# Patient Record
Sex: FEMALE | Race: WHITE | Employment: OTHER | URBAN - METROPOLITAN AREA
[De-identification: names, ages, dates, MRNs, and addresses within clinical notes are randomized per-mention and may not be internally consistent; named-entity substitution may affect disease eponyms.]

---

## 2019-02-18 ENCOUNTER — OFFICE VISIT (OUTPATIENT)
Dept: INTERNAL MEDICINE CLINIC | Age: 84
End: 2019-02-18

## 2019-02-18 VITALS
DIASTOLIC BLOOD PRESSURE: 85 MMHG | WEIGHT: 206 LBS | TEMPERATURE: 97.8 F | BODY MASS INDEX: 32.33 KG/M2 | HEART RATE: 72 BPM | HEIGHT: 67 IN | SYSTOLIC BLOOD PRESSURE: 150 MMHG

## 2019-02-18 DIAGNOSIS — C44.90 SKIN CANCER: ICD-10-CM

## 2019-02-18 DIAGNOSIS — R60.9 PERIPHERAL EDEMA: ICD-10-CM

## 2019-02-18 DIAGNOSIS — Z76.89 ENCOUNTER TO ESTABLISH CARE: Primary | ICD-10-CM

## 2019-02-18 DIAGNOSIS — R03.0 ELEVATED BP WITHOUT DIAGNOSIS OF HYPERTENSION: ICD-10-CM

## 2019-02-18 DIAGNOSIS — R41.3 MEMORY LOSS: ICD-10-CM

## 2019-02-18 PROCEDURE — 81002 URINALYSIS NONAUTO W/O SCOPE: CPT | Performed by: PHYSICIAN ASSISTANT

## 2019-02-18 PROCEDURE — 99204 OFFICE O/P NEW MOD 45 MIN: CPT | Performed by: PHYSICIAN ASSISTANT

## 2019-02-18 ASSESSMENT — ENCOUNTER SYMPTOMS
BACK PAIN: 0
VOMITING: 0
SHORTNESS OF BREATH: 0
NAUSEA: 0
SORE THROAT: 0
BLOOD IN STOOL: 0
DIARRHEA: 0
ABDOMINAL PAIN: 0
COUGH: 0
RHINORRHEA: 0

## 2019-03-07 ENCOUNTER — HOSPITAL ENCOUNTER (EMERGENCY)
Age: 84
Discharge: HOME | End: 2019-03-07
Payer: MEDICARE

## 2019-03-07 VITALS
OXYGEN SATURATION: 96 % | BODY MASS INDEX: 32.33 KG/M2 | HEART RATE: 94 BPM | SYSTOLIC BLOOD PRESSURE: 167 MMHG | DIASTOLIC BLOOD PRESSURE: 85 MMHG | RESPIRATION RATE: 14 BRPM | TEMPERATURE: 97.9 F | WEIGHT: 206 LBS | HEIGHT: 67 IN

## 2019-03-07 DIAGNOSIS — B02.9 HERPES ZOSTER WITHOUT COMPLICATION: Primary | ICD-10-CM

## 2019-03-07 PROCEDURE — 99282 EMERGENCY DEPT VISIT SF MDM: CPT

## 2019-03-07 PROCEDURE — 6370000000 HC RX 637 (ALT 250 FOR IP): Performed by: NURSE PRACTITIONER

## 2019-03-07 RX ORDER — ACYCLOVIR 200 MG/1
800 CAPSULE ORAL ONCE
Status: DISCONTINUED | OUTPATIENT
Start: 2019-03-07 | End: 2019-03-07

## 2019-03-07 RX ORDER — HYDROCODONE BITARTRATE AND ACETAMINOPHEN 5; 325 MG/1; MG/1
1 TABLET ORAL ONCE
Status: COMPLETED | OUTPATIENT
Start: 2019-03-07 | End: 2019-03-07

## 2019-03-07 RX ORDER — ACYCLOVIR 800 MG/1
800 TABLET ORAL ONCE
Status: COMPLETED | OUTPATIENT
Start: 2019-03-07 | End: 2019-03-07

## 2019-03-07 RX ORDER — ACYCLOVIR 800 MG/1
800 TABLET ORAL
Qty: 50 TABLET | Refills: 0 | Status: SHIPPED | OUTPATIENT
Start: 2019-03-07 | End: 2019-03-17

## 2019-03-07 RX ORDER — HYDROCODONE BITARTRATE AND ACETAMINOPHEN 5; 325 MG/1; MG/1
1 TABLET ORAL EVERY 6 HOURS PRN
Qty: 20 TABLET | Refills: 0 | Status: SHIPPED | OUTPATIENT
Start: 2019-03-07 | End: 2019-03-14

## 2019-03-07 RX ADMIN — ACYCLOVIR 800 MG: 800 TABLET ORAL at 18:45

## 2019-03-07 RX ADMIN — HYDROCODONE BITARTRATE AND ACETAMINOPHEN 1 TABLET: 5; 325 TABLET ORAL at 18:46

## 2019-03-07 ASSESSMENT — PAIN SCALES - GENERAL: PAINLEVEL_OUTOF10: 3

## 2019-03-29 ENCOUNTER — HOSPITAL ENCOUNTER (OUTPATIENT)
Age: 84
Discharge: HOME | End: 2019-03-29
Payer: MEDICARE

## 2019-03-29 DIAGNOSIS — Z76.89 ENCOUNTER TO ESTABLISH CARE: ICD-10-CM

## 2019-03-29 DIAGNOSIS — R41.3 MEMORY LOSS: ICD-10-CM

## 2019-03-29 DIAGNOSIS — R03.0 ELEVATED BP WITHOUT DIAGNOSIS OF HYPERTENSION: ICD-10-CM

## 2019-03-29 LAB
A/G RATIO: 1.2 (ref 1.1–2.2)
ALBUMIN SERPL-MCNC: 3.9 G/DL (ref 3.4–5)
ALP BLD-CCNC: 77 U/L (ref 40–129)
ALT SERPL-CCNC: 10 U/L (ref 10–40)
ANION GAP SERPL CALCULATED.3IONS-SCNC: 7 MMOL/L (ref 3–16)
AST SERPL-CCNC: 10 U/L (ref 15–37)
BASOPHILS ABSOLUTE: 0 K/UL (ref 0–0.2)
BASOPHILS RELATIVE PERCENT: 0.4 %
BILIRUB SERPL-MCNC: 0.5 MG/DL (ref 0–1)
BUN BLDV-MCNC: 22 MG/DL (ref 7–20)
CALCIUM SERPL-MCNC: 9.6 MG/DL (ref 8.3–10.6)
CHLORIDE BLD-SCNC: 105 MMOL/L (ref 99–110)
CHOLESTEROL, TOTAL: 222 MG/DL (ref 0–199)
CO2: 27 MMOL/L (ref 21–32)
CREAT SERPL-MCNC: 0.6 MG/DL (ref 0.6–1.2)
EOSINOPHILS ABSOLUTE: 0.1 K/UL (ref 0–0.6)
EOSINOPHILS RELATIVE PERCENT: 1.5 %
GFR AFRICAN AMERICAN: >60
GFR NON-AFRICAN AMERICAN: >60
GLOBULIN: 3.2 G/DL
GLUCOSE BLD-MCNC: 103 MG/DL (ref 70–99)
HCT VFR BLD CALC: 39.3 % (ref 36–48)
HDLC SERPL-MCNC: 62 MG/DL (ref 40–60)
HEMOGLOBIN: 12.9 G/DL (ref 12–16)
LDL CHOLESTEROL CALCULATED: 147 MG/DL
LYMPHOCYTES ABSOLUTE: 0.8 K/UL (ref 1–5.1)
LYMPHOCYTES RELATIVE PERCENT: 12.1 %
MCH RBC QN AUTO: 29.3 PG (ref 26–34)
MCHC RBC AUTO-ENTMCNC: 32.8 G/DL (ref 31–36)
MCV RBC AUTO: 89.5 FL (ref 80–100)
MONOCYTES ABSOLUTE: 0.7 K/UL (ref 0–1.3)
MONOCYTES RELATIVE PERCENT: 10.6 %
NEUTROPHILS ABSOLUTE: 5 K/UL (ref 1.7–7.7)
NEUTROPHILS RELATIVE PERCENT: 75.4 %
PDW BLD-RTO: 15 % (ref 12.4–15.4)
PLATELET # BLD: 256 K/UL (ref 135–450)
PMV BLD AUTO: 8.3 FL (ref 5–10.5)
POTASSIUM SERPL-SCNC: 4.2 MMOL/L (ref 3.5–5.1)
RBC # BLD: 4.39 M/UL (ref 4–5.2)
SODIUM BLD-SCNC: 139 MMOL/L (ref 136–145)
TOTAL PROTEIN: 7.1 G/DL (ref 6.4–8.2)
TRIGL SERPL-MCNC: 64 MG/DL (ref 0–150)
TSH REFLEX: 3.03 UIU/ML (ref 0.27–4.2)
VITAMIN B-12: 228 PG/ML (ref 211–911)
VLDLC SERPL CALC-MCNC: 13 MG/DL
WBC # BLD: 6.7 K/UL (ref 4–11)

## 2019-03-29 PROCEDURE — 84443 ASSAY THYROID STIM HORMONE: CPT

## 2019-03-29 PROCEDURE — 85025 COMPLETE CBC W/AUTO DIFF WBC: CPT

## 2019-03-29 PROCEDURE — 82607 VITAMIN B-12: CPT

## 2019-03-29 PROCEDURE — 80053 COMPREHEN METABOLIC PANEL: CPT

## 2019-03-29 PROCEDURE — 80061 LIPID PANEL: CPT

## 2019-03-29 PROCEDURE — 36415 COLL VENOUS BLD VENIPUNCTURE: CPT

## 2019-04-04 ENCOUNTER — TELEPHONE (OUTPATIENT)
Dept: INTERNAL MEDICINE CLINIC | Age: 84
End: 2019-04-04

## 2019-04-04 NOTE — TELEPHONE ENCOUNTER
----- Message from Dilma Leo PA-C sent at 4/4/2019 10:03 AM EDT -----  Contact: [phone]  I do not remember that being a concern at their first appointment, in fact they told me it was chronic and unchanged. Is it different now than it was when they saw me? I would recommend compression hose and keeping her legs elevated- have they tried this?      ----- Message -----  From: Geovani Hayes  Sent: 4/4/2019   9:52 AM  To: Dilma Leo PA-C    Pts spouse calling again wanting an answer to their question from yesterday that I sent to you. Spouse really upset. Please advise.
diffuse

## 2019-04-10 ENCOUNTER — HOSPITAL ENCOUNTER (OUTPATIENT)
Dept: CT IMAGING | Age: 84
Discharge: HOME | End: 2019-04-10
Payer: MEDICARE

## 2019-04-10 DIAGNOSIS — R41.3 MEMORY LOSS: ICD-10-CM

## 2019-04-10 PROCEDURE — 70450 CT HEAD/BRAIN W/O DYE: CPT

## 2019-04-15 ENCOUNTER — OFFICE VISIT (OUTPATIENT)
Dept: INTERNAL MEDICINE CLINIC | Age: 84
End: 2019-04-15

## 2019-04-15 VITALS
WEIGHT: 195 LBS | HEART RATE: 70 BPM | DIASTOLIC BLOOD PRESSURE: 80 MMHG | BODY MASS INDEX: 30.61 KG/M2 | HEIGHT: 67 IN | RESPIRATION RATE: 14 BRPM | SYSTOLIC BLOOD PRESSURE: 130 MMHG

## 2019-04-15 DIAGNOSIS — R41.3 MEMORY LOSS: ICD-10-CM

## 2019-04-15 DIAGNOSIS — E53.8 VITAMIN B12 DEFICIENCY: ICD-10-CM

## 2019-04-15 DIAGNOSIS — I35.8 AORTIC HEART MURMUR: ICD-10-CM

## 2019-04-15 DIAGNOSIS — B02.29 POST HERPETIC NEURALGIA: ICD-10-CM

## 2019-04-15 DIAGNOSIS — R60.9 PERIPHERAL EDEMA: Primary | ICD-10-CM

## 2019-04-15 PROCEDURE — G8400 PT W/DXA NO RESULTS DOC: HCPCS | Performed by: INTERNAL MEDICINE

## 2019-04-15 PROCEDURE — 1123F ACP DISCUSS/DSCN MKR DOCD: CPT | Performed by: INTERNAL MEDICINE

## 2019-04-15 PROCEDURE — 4040F PNEUMOC VAC/ADMIN/RCVD: CPT | Performed by: INTERNAL MEDICINE

## 2019-04-15 PROCEDURE — G8417 CALC BMI ABV UP PARAM F/U: HCPCS | Performed by: INTERNAL MEDICINE

## 2019-04-15 PROCEDURE — 1090F PRES/ABSN URINE INCON ASSESS: CPT | Performed by: INTERNAL MEDICINE

## 2019-04-15 PROCEDURE — G8428 CUR MEDS NOT DOCUMENT: HCPCS | Performed by: INTERNAL MEDICINE

## 2019-04-15 PROCEDURE — 1036F TOBACCO NON-USER: CPT | Performed by: INTERNAL MEDICINE

## 2019-04-15 PROCEDURE — 99214 OFFICE O/P EST MOD 30 MIN: CPT | Performed by: INTERNAL MEDICINE

## 2019-04-15 RX ORDER — GABAPENTIN 100 MG/1
100 CAPSULE ORAL EVERY EVENING
Qty: 30 CAPSULE | Refills: 1 | Status: SHIPPED | OUTPATIENT
Start: 2019-04-15 | End: 2019-11-07 | Stop reason: SDUPTHER

## 2019-04-15 RX ORDER — FUROSEMIDE 20 MG/1
20 TABLET ORAL DAILY
Qty: 30 TABLET | Refills: 1 | Status: SHIPPED | OUTPATIENT
Start: 2019-04-15 | End: 2019-08-05 | Stop reason: SDUPTHER

## 2019-04-15 ASSESSMENT — ENCOUNTER SYMPTOMS
NAUSEA: 0
WHEEZING: 0
ABDOMINAL PAIN: 0
SHORTNESS OF BREATH: 0
VOMITING: 0
RHINORRHEA: 0

## 2019-04-15 ASSESSMENT — PATIENT HEALTH QUESTIONNAIRE - PHQ9
SUM OF ALL RESPONSES TO PHQ QUESTIONS 1-9: 0
1. LITTLE INTEREST OR PLEASURE IN DOING THINGS: 0
2. FEELING DOWN, DEPRESSED OR HOPELESS: 0
SUM OF ALL RESPONSES TO PHQ QUESTIONS 1-9: 0
SUM OF ALL RESPONSES TO PHQ9 QUESTIONS 1 & 2: 0

## 2019-04-15 NOTE — PROGRESS NOTES
Subjective:      Patient ID: Andreas Campbell is a 80 y.o. female. HPI    Patient is here follow up. She is here with her care giver. She saw my PA. There was concern she had memory issues but her caregiver feels her memory is doing ok. She had a CT of her head that showed some enlarged ventricles. She is walking with a walker and she does not have any issues with urinary incontinence during the daytime. She has some chronic edema. No issues with dyspnea. She had shingles left trunk. It was diagnosed at [facility]. She has some pain. Rash has faded. She has mild HLD. No history of CAD. She has Vitamin B 12 deficiency. She needs to start supplement. Review of Systems   Constitutional: Negative for appetite change and fatigue. HENT: Negative for postnasal drip and rhinorrhea. Respiratory: Negative for shortness of breath and wheezing. Cardiovascular: Negative for chest pain and palpitations. Gastrointestinal: Negative for abdominal pain, nausea and vomiting. Musculoskeletal: Negative for joint swelling. Skin: Negative for rash. Neurological: Negative for light-headedness. Psychiatric/Behavioral: Negative for sleep disturbance. No past medical history on file. No past surgical history on file. No family history on file. Social History     Tobacco Use    Smoking status: Never Smoker    Smokeless tobacco: Never Used   Substance Use Topics    Alcohol use: No    Drug use: No       /80 (Site: Left Upper Arm, Position: Sitting, Cuff Size: Medium Adult)   Pulse 70   Resp 14   Ht 5' 7\" (1.702 m)   Wt 195 lb (88.5 kg)   BMI 30.54 kg/m²     Objective:   Physical Exam   Constitutional: She is oriented to person, place, and time. She appears well-developed and well-nourished. HENT:   Head: Normocephalic. Eyes: Pupils are equal, round, and reactive to light. Conjunctivae and EOM are normal.   Neck: Trachea normal and normal range of motion. Neck supple. No JVD present. Carotid bruit is not present. No thyroid mass and no thyromegaly present. Cardiovascular: Normal rate and regular rhythm. Exam reveals no gallop. Murmur (aortic) heard. Pulmonary/Chest: Effort normal and breath sounds normal. No respiratory distress. She has no wheezes. She has no rales. Abdominal: Soft. Bowel sounds are normal. She exhibits no distension and no mass. There is no splenomegaly or hepatomegaly. There is no tenderness. Musculoskeletal: Normal range of motion. She exhibits edema (++). Lymphadenopathy:     She has no cervical adenopathy. Neurological: She is alert and oriented to person, place, and time. She has normal strength and normal reflexes. No cranial nerve deficit. Skin: Skin is warm and dry. No rash noted. Psychiatric: She has a normal mood and affect. Her behavior is normal. Judgment and thought content normal.     CT head 4/10/2019     Impression   No acute intracranial abnormality.       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  Findings can be seen   in normal pressure hydrocephalus if in the correct clinical setting.       Mild parenchymal volume loss.       Moderate chronic microvascular disease       Assessment:       Diagnosis Orders   1. Peripheral edema     2. Memory loss     3. Post herpetic neuralgia     4. Aortic heart murmur  ECHO Complete 2D W Doppler W Color   5. Vitamin B12 deficiency             Plan:      #  Edema. Will start Lasix 20 mg po daily. #  Post herpetic neuralgia: try Neurontin 100 mg po qhs. #  Memory loss. CT reviewed with patient. I doubt NPH. Memory is not too bad. #  HLD. Monitor. No need to treat. #  Vitamin B 12 given.         Eli Goncalves MD

## 2019-07-25 ENCOUNTER — OFFICE VISIT (OUTPATIENT)
Dept: FAMILY MEDICINE CLINIC | Age: 84
End: 2019-07-25
Payer: MEDICARE

## 2019-07-25 VITALS
DIASTOLIC BLOOD PRESSURE: 84 MMHG | HEART RATE: 79 BPM | WEIGHT: 200.6 LBS | OXYGEN SATURATION: 98 % | BODY MASS INDEX: 31.48 KG/M2 | SYSTOLIC BLOOD PRESSURE: 142 MMHG | HEIGHT: 67 IN

## 2019-07-25 DIAGNOSIS — R41.3 MEMORY LOSS: ICD-10-CM

## 2019-07-25 DIAGNOSIS — R60.9 PERIPHERAL EDEMA: ICD-10-CM

## 2019-07-25 DIAGNOSIS — R01.1 SYSTOLIC MURMUR: ICD-10-CM

## 2019-07-25 DIAGNOSIS — Z76.89 ENCOUNTER TO ESTABLISH CARE: Primary | ICD-10-CM

## 2019-07-25 PROCEDURE — G8400 PT W/DXA NO RESULTS DOC: HCPCS | Performed by: PHYSICIAN ASSISTANT

## 2019-07-25 PROCEDURE — 1090F PRES/ABSN URINE INCON ASSESS: CPT | Performed by: PHYSICIAN ASSISTANT

## 2019-07-25 PROCEDURE — G8427 DOCREV CUR MEDS BY ELIG CLIN: HCPCS | Performed by: PHYSICIAN ASSISTANT

## 2019-07-25 PROCEDURE — 1123F ACP DISCUSS/DSCN MKR DOCD: CPT | Performed by: PHYSICIAN ASSISTANT

## 2019-07-25 PROCEDURE — 1036F TOBACCO NON-USER: CPT | Performed by: PHYSICIAN ASSISTANT

## 2019-07-25 PROCEDURE — 4040F PNEUMOC VAC/ADMIN/RCVD: CPT | Performed by: PHYSICIAN ASSISTANT

## 2019-07-25 PROCEDURE — G8417 CALC BMI ABV UP PARAM F/U: HCPCS | Performed by: PHYSICIAN ASSISTANT

## 2019-07-25 PROCEDURE — 99214 OFFICE O/P EST MOD 30 MIN: CPT | Performed by: PHYSICIAN ASSISTANT

## 2019-07-25 ASSESSMENT — ENCOUNTER SYMPTOMS
VOMITING: 0
DIARRHEA: 0
NAUSEA: 0
SHORTNESS OF BREATH: 0
ABDOMINAL PAIN: 0
CONSTIPATION: 0
COUGH: 0
RHINORRHEA: 0
SORE THROAT: 0

## 2019-07-25 NOTE — PROGRESS NOTES
2019  Irais Crews (: 1935)  80 y.o. HPI    Patient presents to establish care. Not a reliable historian 2/2 to dementia. Presents with medical POA, life partner, not , but have been together over 36 years. Does not have paperwork on hand, but will bring to the office. Pt defers most questions to him. Had PCP but switching due to location and personal preference. Patient has chronic BLE. Has been prescribed furosemide 20 mg daily but has not been taking. Worse in the evenings improved in the am. Painful. Does endorse some MO. Denies orthopnea. Has known HLD but no history of CAD, per pt partner. Recently evaluated for memory changes. CT head showed enlarged ventricles, parenchymal volume loss and moderate chronic microvascular disease. Given age, statin not recommended. Pt and partner report she is able to bathe and toilet herself. Partner does all of the cooking and cleaning. Patient is able to ambulate with a can. No recent falls. Review of Systems   Constitutional: Negative for activity change, chills and fever. HENT: Negative for congestion, ear pain, rhinorrhea and sore throat. Eyes: Negative for visual disturbance. Respiratory: Negative for cough and shortness of breath. Cardiovascular: Positive for leg swelling (bilateral). Negative for chest pain and palpitations. Gastrointestinal: Negative for abdominal pain, constipation, diarrhea, nausea and vomiting. Genitourinary: Negative for difficulty urinating and dysuria. Musculoskeletal: Negative for arthralgias and myalgias. Skin: Negative for rash. Neurological: Negative for dizziness, weakness and numbness. +memory changes   Psychiatric/Behavioral: Positive for confusion. Negative for sleep disturbance. No past medical history on file. No past surgical history on file.   Family History   Problem Relation Age of Onset    Heart Attack Brother      Social History     Socioeconomic History

## 2019-08-05 RX ORDER — FUROSEMIDE 20 MG/1
20 TABLET ORAL DAILY
Qty: 30 TABLET | Refills: 1 | Status: SHIPPED | OUTPATIENT
Start: 2019-08-05 | End: 2019-11-07 | Stop reason: SDUPTHER

## 2019-08-21 ENCOUNTER — HOSPITAL ENCOUNTER (OUTPATIENT)
Dept: CARDIOLOGY | Age: 84
Discharge: HOME | End: 2019-08-21
Payer: MEDICARE

## 2019-08-21 DIAGNOSIS — R60.9 PERIPHERAL EDEMA: ICD-10-CM

## 2019-08-21 DIAGNOSIS — R01.1 SYSTOLIC MURMUR: ICD-10-CM

## 2019-08-21 LAB
LV EF: 55 %
LVEF MODALITY: NORMAL

## 2019-08-21 PROCEDURE — 93306 TTE W/DOPPLER COMPLETE: CPT

## 2019-11-06 ENCOUNTER — TELEPHONE (OUTPATIENT)
Dept: FAMILY MEDICINE CLINIC | Age: 84
End: 2019-11-06

## 2019-11-07 ENCOUNTER — OFFICE VISIT (OUTPATIENT)
Dept: FAMILY MEDICINE CLINIC | Age: 84
End: 2019-11-07
Payer: MEDICARE

## 2019-11-07 VITALS
TEMPERATURE: 98.7 F | SYSTOLIC BLOOD PRESSURE: 164 MMHG | RESPIRATION RATE: 16 BRPM | OXYGEN SATURATION: 97 % | DIASTOLIC BLOOD PRESSURE: 76 MMHG | BODY MASS INDEX: 33.82 KG/M2 | HEIGHT: 66 IN | WEIGHT: 210.4 LBS | HEART RATE: 80 BPM

## 2019-11-07 DIAGNOSIS — I87.2 VENOUS INSUFFICIENCY: Primary | ICD-10-CM

## 2019-11-07 PROCEDURE — 99213 OFFICE O/P EST LOW 20 MIN: CPT | Performed by: PHYSICIAN ASSISTANT

## 2019-11-07 PROCEDURE — 1036F TOBACCO NON-USER: CPT | Performed by: PHYSICIAN ASSISTANT

## 2019-11-07 PROCEDURE — G8400 PT W/DXA NO RESULTS DOC: HCPCS | Performed by: PHYSICIAN ASSISTANT

## 2019-11-07 PROCEDURE — 1090F PRES/ABSN URINE INCON ASSESS: CPT | Performed by: PHYSICIAN ASSISTANT

## 2019-11-07 PROCEDURE — G8484 FLU IMMUNIZE NO ADMIN: HCPCS | Performed by: PHYSICIAN ASSISTANT

## 2019-11-07 PROCEDURE — G8417 CALC BMI ABV UP PARAM F/U: HCPCS | Performed by: PHYSICIAN ASSISTANT

## 2019-11-07 PROCEDURE — 1123F ACP DISCUSS/DSCN MKR DOCD: CPT | Performed by: PHYSICIAN ASSISTANT

## 2019-11-07 PROCEDURE — G8427 DOCREV CUR MEDS BY ELIG CLIN: HCPCS | Performed by: PHYSICIAN ASSISTANT

## 2019-11-07 PROCEDURE — 4040F PNEUMOC VAC/ADMIN/RCVD: CPT | Performed by: PHYSICIAN ASSISTANT

## 2019-11-07 RX ORDER — GABAPENTIN 100 MG/1
100 CAPSULE ORAL NIGHTLY
Qty: 30 CAPSULE | Refills: 0 | Status: SHIPPED | OUTPATIENT
Start: 2019-11-07 | End: 2020-12-28 | Stop reason: ALTCHOICE

## 2019-11-07 RX ORDER — FUROSEMIDE 20 MG/1
TABLET ORAL
Qty: 45 TABLET | Refills: 2 | Status: SHIPPED | OUTPATIENT
Start: 2019-11-07 | End: 2021-01-22 | Stop reason: SDUPTHER

## 2019-11-17 ASSESSMENT — ENCOUNTER SYMPTOMS
VOMITING: 0
RHINORRHEA: 0
SORE THROAT: 0
COUGH: 0
CONSTIPATION: 0
DIARRHEA: 0
SHORTNESS OF BREATH: 0
NAUSEA: 0
ABDOMINAL PAIN: 0

## 2020-12-24 ENCOUNTER — NURSE TRIAGE (OUTPATIENT)
Dept: OTHER | Facility: CLINIC | Age: 85
End: 2020-12-24

## 2020-12-24 NOTE — TELEPHONE ENCOUNTER
C/o bilateral leg swelling, left worse then right. Been seeing dead relatives for last couple days and wanted to go back to childhood home. Reason for Disposition   [1] MILD swelling of both ankles (i.e., pedal edema) AND [2] new onset or worsening    Answer Assessment - Initial Assessment Questions  1. ONSET: \"When did the swelling start? \" (e.g., minutes, hours, days)      Both legs    2. LOCATION: \"What part of the leg is swollen? \"  \"Are both legs swollen or just one leg? \"      Knee down to foot    3. SEVERITY: \"How bad is the swelling? \" (e.g., localized; mild, moderate, severe)   - Localized - small area of swelling localized to one leg   - MILD pedal edema - swelling limited to foot and ankle, pitting edema < 1/4 inch (6 mm) deep, rest and elevation eliminate most or all swelling   - MODERATE edema - swelling of lower leg to knee, pitting edema > 1/4 inch (6 mm) deep, rest and elevation only partially reduce swelling   - SEVERE edema - swelling extends above knee, facial or hand swelling present       Moderate    4. REDNESS: \"Does the swelling look red or infected? \"      Red    5. PAIN: \"Is the swelling painful to touch? \" If so, ask: \"How painful is it? \"   (Scale 1-10; mild, moderate or severe)      Mild    6. FEVER: \"Do you have a fever? \" If so, ask: \"What is it, how was it measured, and when did it start? \"       Denies    7. CAUSE: \"What do you think is causing the leg swelling? \"      Not sure    8. MEDICAL HISTORY: \"Do you have a history of heart failure, kidney disease, liver failure, or cancer? \"      htn    9. RECURRENT SYMPTOM: \"Have you had leg swelling before? \" If so, ask: \"When was the last time? \" \"What happened that time? \"      htn    10. OTHER SYMPTOMS: \"Do you have any other symptoms? \" (e.g., chest pain, difficulty breathing)        Denies    11. PREGNANCY: \"Is there any chance you are pregnant? \" \"When was your last menstrual period? \"        Na    Protocols used: LEG SWELLING AND [facility]    Patient called pre-service center [facility]) to schedule appointment, with red flag complaint, transferred to RN access for triage. See above questions and answers. Caller talking full sentences without any distress on phone. Discussed disposition and patient agreeable. Discussed potential consequences for not following disposition recommendation. Aware to call back with any concerns or persistent, worsening, or new symptoms develop. Warm transfer to [facility] [place] for appointment. Attention Provider: Thank you for allowing me to participate in the care of your patient. The  patient was connected to triage in response to information provided to the E[facility]. Please do not respond through this encounter as the response is not directed to a shared pool.

## 2020-12-28 ENCOUNTER — VIRTUAL VISIT (OUTPATIENT)
Dept: FAMILY MEDICINE CLINIC | Age: 85
End: 2020-12-28
Payer: MEDICARE

## 2020-12-28 PROCEDURE — 4040F PNEUMOC VAC/ADMIN/RCVD: CPT | Performed by: FAMILY MEDICINE

## 2020-12-28 PROCEDURE — 1036F TOBACCO NON-USER: CPT | Performed by: FAMILY MEDICINE

## 2020-12-28 PROCEDURE — 1090F PRES/ABSN URINE INCON ASSESS: CPT | Performed by: FAMILY MEDICINE

## 2020-12-28 PROCEDURE — G8427 DOCREV CUR MEDS BY ELIG CLIN: HCPCS | Performed by: FAMILY MEDICINE

## 2020-12-28 PROCEDURE — 99214 OFFICE O/P EST MOD 30 MIN: CPT | Performed by: FAMILY MEDICINE

## 2020-12-28 PROCEDURE — G8421 BMI NOT CALCULATED: HCPCS | Performed by: FAMILY MEDICINE

## 2020-12-28 PROCEDURE — 1123F ACP DISCUSS/DSCN MKR DOCD: CPT | Performed by: FAMILY MEDICINE

## 2020-12-28 PROCEDURE — G8400 PT W/DXA NO RESULTS DOC: HCPCS | Performed by: FAMILY MEDICINE

## 2020-12-28 PROCEDURE — G8484 FLU IMMUNIZE NO ADMIN: HCPCS | Performed by: FAMILY MEDICINE

## 2020-12-28 RX ORDER — FUROSEMIDE 20 MG/1
20 TABLET ORAL DAILY
Qty: 30 TABLET | Refills: 0 | Status: SHIPPED | OUTPATIENT
Start: 2020-12-28 | End: 2021-01-01 | Stop reason: SDUPTHER

## 2020-12-28 ASSESSMENT — ENCOUNTER SYMPTOMS
BACK PAIN: 0
VOMITING: 0
NAUSEA: 0
CHEST TIGHTNESS: 0
COLOR CHANGE: 1
SHORTNESS OF BREATH: 0
ABDOMINAL PAIN: 0

## 2020-12-28 ASSESSMENT — PATIENT HEALTH QUESTIONNAIRE - PHQ9: DEPRESSION UNABLE TO ASSESS: FUNCTIONAL CAPACITY MOTIVATION LIMITS ACCURACY

## 2021-01-01 ENCOUNTER — VIRTUAL VISIT (OUTPATIENT)
Dept: FAMILY MEDICINE CLINIC | Age: 86
End: 2021-01-01
Payer: MEDICARE

## 2021-01-01 ENCOUNTER — TELEPHONE (OUTPATIENT)
Dept: FAMILY MEDICINE CLINIC | Age: 86
End: 2021-01-01

## 2021-01-01 ENCOUNTER — PATIENT MESSAGE (OUTPATIENT)
Dept: FAMILY MEDICINE CLINIC | Age: 86
End: 2021-01-01

## 2021-01-01 ENCOUNTER — NURSE ONLY (OUTPATIENT)
Dept: FAMILY MEDICINE CLINIC | Age: 86
End: 2021-01-01
Payer: MEDICARE

## 2021-01-01 ENCOUNTER — OFFICE VISIT (OUTPATIENT)
Dept: FAMILY MEDICINE CLINIC | Age: 86
End: 2021-01-01
Payer: MEDICARE

## 2021-01-01 ENCOUNTER — HOSPITAL ENCOUNTER (OUTPATIENT)
Dept: MRI IMAGING | Age: 86
Discharge: HOME | End: 2021-12-10
Payer: MEDICARE

## 2021-01-01 ENCOUNTER — HOSPITAL ENCOUNTER (EMERGENCY)
Age: 86
Discharge: LEFT BEFORE DISCHARGE | End: 2021-12-23

## 2021-01-01 ENCOUNTER — TELEMEDICINE (OUTPATIENT)
Dept: PRIMARY CARE CLINIC | Age: 86
End: 2021-01-01
Payer: MEDICARE

## 2021-01-01 VITALS
HEART RATE: 70 BPM | TEMPERATURE: 98.1 F | WEIGHT: 205 LBS | BODY MASS INDEX: 38.71 KG/M2 | SYSTOLIC BLOOD PRESSURE: 164 MMHG | RESPIRATION RATE: 14 BRPM | OXYGEN SATURATION: 99 % | HEIGHT: 61 IN | DIASTOLIC BLOOD PRESSURE: 69 MMHG

## 2021-01-01 VITALS
HEIGHT: 61 IN | OXYGEN SATURATION: 98 % | WEIGHT: 201.6 LBS | SYSTOLIC BLOOD PRESSURE: 138 MMHG | BODY MASS INDEX: 38.06 KG/M2 | DIASTOLIC BLOOD PRESSURE: 82 MMHG | HEART RATE: 78 BPM | TEMPERATURE: 98.3 F

## 2021-01-01 VITALS
SYSTOLIC BLOOD PRESSURE: 132 MMHG | BODY MASS INDEX: 38.51 KG/M2 | HEIGHT: 61 IN | OXYGEN SATURATION: 97 % | DIASTOLIC BLOOD PRESSURE: 84 MMHG | TEMPERATURE: 97.6 F | HEART RATE: 72 BPM | WEIGHT: 204 LBS

## 2021-01-01 DIAGNOSIS — F51.04 PSYCHOPHYSIOLOGICAL INSOMNIA: ICD-10-CM

## 2021-01-01 DIAGNOSIS — R41.82 ALTERED MENTAL STATUS, UNSPECIFIED ALTERED MENTAL STATUS TYPE: ICD-10-CM

## 2021-01-01 DIAGNOSIS — G91.0 COMMUNICATING HYDROCEPHALUS (HCC): ICD-10-CM

## 2021-01-01 DIAGNOSIS — R60.0 BILATERAL LOWER EXTREMITY EDEMA: ICD-10-CM

## 2021-01-01 DIAGNOSIS — F03.91 DEMENTIA WITH BEHAVIORAL DISTURBANCE, UNSPECIFIED DEMENTIA TYPE: Primary | ICD-10-CM

## 2021-01-01 DIAGNOSIS — F03.91 DEMENTIA WITH BEHAVIORAL DISTURBANCE, UNSPECIFIED DEMENTIA TYPE: ICD-10-CM

## 2021-01-01 DIAGNOSIS — G91.2 NORMAL PRESSURE HYDROCEPHALUS (HCC): Primary | ICD-10-CM

## 2021-01-01 DIAGNOSIS — L03.115 CELLULITIS OF RIGHT ANTERIOR LOWER LEG: Primary | ICD-10-CM

## 2021-01-01 DIAGNOSIS — F41.9 ANXIETY: ICD-10-CM

## 2021-01-01 DIAGNOSIS — G91.1 ACQUIRED CEREBRAL VENTRICULOMEGALY (HCC): Primary | ICD-10-CM

## 2021-01-01 DIAGNOSIS — S81.802D WOUND OF LEFT LOWER EXTREMITY, SUBSEQUENT ENCOUNTER: Primary | ICD-10-CM

## 2021-01-01 DIAGNOSIS — N30.00 ACUTE CYSTITIS WITHOUT HEMATURIA: Primary | ICD-10-CM

## 2021-01-01 DIAGNOSIS — R41.0 CONFUSION: Primary | ICD-10-CM

## 2021-01-01 DIAGNOSIS — F03.90 DEMENTIA WITHOUT BEHAVIORAL DISTURBANCE, UNSPECIFIED DEMENTIA TYPE: Primary | ICD-10-CM

## 2021-01-01 DIAGNOSIS — M79.89 SWELLING OF BOTH LOWER EXTREMITIES: ICD-10-CM

## 2021-01-01 DIAGNOSIS — R82.998 LEUKOCYTES IN URINE: ICD-10-CM

## 2021-01-01 DIAGNOSIS — I87.2 VENOUS INSUFFICIENCY (CHRONIC) (PERIPHERAL): ICD-10-CM

## 2021-01-01 DIAGNOSIS — F06.4 ANXIETY DISORDER DUE TO KNOWN PHYSIOLOGICAL CONDITION: ICD-10-CM

## 2021-01-01 DIAGNOSIS — G91.1 ACQUIRED CEREBRAL VENTRICULOMEGALY (HCC): ICD-10-CM

## 2021-01-01 DIAGNOSIS — L02.415 ABSCESS OF RIGHT LOWER LEG: ICD-10-CM

## 2021-01-01 DIAGNOSIS — F03.90 DEMENTIA WITHOUT BEHAVIORAL DISTURBANCE, UNSPECIFIED DEMENTIA TYPE: ICD-10-CM

## 2021-01-01 LAB
A/G RATIO: 2.1 (ref 1.1–2.2)
ALBUMIN SERPL-MCNC: 4.2 G/DL (ref 3.4–5)
ALP BLD-CCNC: 94 U/L (ref 40–129)
ALT SERPL-CCNC: 13 U/L (ref 10–40)
ANION GAP SERPL CALCULATED.3IONS-SCNC: 15 MMOL/L (ref 3–16)
AST SERPL-CCNC: 12 U/L (ref 15–37)
BILIRUB SERPL-MCNC: <0.2 MG/DL (ref 0–1)
BILIRUBIN, POC: ABNORMAL
BILIRUBIN, POC: NEGATIVE
BLOOD URINE, POC: ABNORMAL
BLOOD URINE, POC: ABNORMAL
BUN BLDV-MCNC: 23 MG/DL (ref 7–20)
CALCIUM SERPL-MCNC: 9.5 MG/DL (ref 8.3–10.6)
CHLORIDE BLD-SCNC: 100 MMOL/L (ref 99–110)
CLARITY, POC: ABNORMAL
CLARITY, POC: ABNORMAL
CO2: 23 MMOL/L (ref 21–32)
COLOR, POC: ABNORMAL
COLOR, POC: ABNORMAL
CREAT SERPL-MCNC: 0.8 MG/DL (ref 0.6–1.2)
GFR AFRICAN AMERICAN: >60
GFR NON-AFRICAN AMERICAN: >60
GLUCOSE BLD-MCNC: 94 MG/DL (ref 70–99)
GLUCOSE URINE, POC: ABNORMAL
GLUCOSE URINE, POC: NEGATIVE
HCT VFR BLD CALC: 36.8 % (ref 36–48)
HEMOGLOBIN: 11.9 G/DL (ref 12–16)
KETONES, POC: ABNORMAL
KETONES, POC: NEGATIVE
LEUKOCYTE EST, POC: ABNORMAL
LEUKOCYTE EST, POC: ABNORMAL
MAGNESIUM: 2.3 MG/DL (ref 1.8–2.4)
MCH RBC QN AUTO: 28.7 PG (ref 26–34)
MCHC RBC AUTO-ENTMCNC: 32.3 G/DL (ref 31–36)
MCV RBC AUTO: 88.8 FL (ref 80–100)
NITRITE, POC: NEGATIVE
NITRITE, POC: POSITIVE
ORGANISM: ABNORMAL
ORGANISM: ABNORMAL
PDW BLD-RTO: 14.4 % (ref 12.4–15.4)
PH, POC: 5.5
PH, POC: 6
PLATELET # BLD: 252 K/UL (ref 135–450)
PMV BLD AUTO: 8.3 FL (ref 5–10.5)
POTASSIUM SERPL-SCNC: 4.2 MMOL/L (ref 3.5–5.1)
PROTEIN, POC: ABNORMAL
PROTEIN, POC: NEGATIVE
RBC # BLD: 4.14 M/UL (ref 4–5.2)
SODIUM BLD-SCNC: 138 MMOL/L (ref 136–145)
SPECIFIC GRAVITY, POC: 1.02
SPECIFIC GRAVITY, POC: 1.02
T4 FREE: 1.1 NG/DL (ref 0.9–1.8)
TOTAL PROTEIN: 6.2 G/DL (ref 6.4–8.2)
TSH REFLEX: 6.82 UIU/ML (ref 0.27–4.2)
URINE CULTURE, ROUTINE: ABNORMAL
UROBILINOGEN, POC: 1
UROBILINOGEN, POC: ABNORMAL
WBC # BLD: 8.9 K/UL (ref 4–11)

## 2021-01-01 PROCEDURE — 1036F TOBACCO NON-USER: CPT | Performed by: PHYSICIAN ASSISTANT

## 2021-01-01 PROCEDURE — 1090F PRES/ABSN URINE INCON ASSESS: CPT | Performed by: PHYSICIAN ASSISTANT

## 2021-01-01 PROCEDURE — 4040F PNEUMOC VAC/ADMIN/RCVD: CPT | Performed by: PHYSICIAN ASSISTANT

## 2021-01-01 PROCEDURE — 99213 OFFICE O/P EST LOW 20 MIN: CPT | Performed by: PHYSICIAN ASSISTANT

## 2021-01-01 PROCEDURE — G8427 DOCREV CUR MEDS BY ELIG CLIN: HCPCS | Performed by: PHYSICIAN ASSISTANT

## 2021-01-01 PROCEDURE — G8417 CALC BMI ABV UP PARAM F/U: HCPCS | Performed by: PHYSICIAN ASSISTANT

## 2021-01-01 PROCEDURE — 1123F ACP DISCUSS/DSCN MKR DOCD: CPT | Performed by: NURSE PRACTITIONER

## 2021-01-01 PROCEDURE — G8484 FLU IMMUNIZE NO ADMIN: HCPCS | Performed by: PHYSICIAN ASSISTANT

## 2021-01-01 PROCEDURE — 1123F ACP DISCUSS/DSCN MKR DOCD: CPT | Performed by: PHYSICIAN ASSISTANT

## 2021-01-01 PROCEDURE — G8428 CUR MEDS NOT DOCUMENT: HCPCS | Performed by: NURSE PRACTITIONER

## 2021-01-01 PROCEDURE — 70551 MRI BRAIN STEM W/O DYE: CPT

## 2021-01-01 PROCEDURE — 1090F PRES/ABSN URINE INCON ASSESS: CPT | Performed by: NURSE PRACTITIONER

## 2021-01-01 PROCEDURE — 81002 URINALYSIS NONAUTO W/O SCOPE: CPT | Performed by: PHYSICIAN ASSISTANT

## 2021-01-01 PROCEDURE — 99213 OFFICE O/P EST LOW 20 MIN: CPT | Performed by: NURSE PRACTITIONER

## 2021-01-01 PROCEDURE — 99214 OFFICE O/P EST MOD 30 MIN: CPT | Performed by: PHYSICIAN ASSISTANT

## 2021-01-01 PROCEDURE — 4040F PNEUMOC VAC/ADMIN/RCVD: CPT | Performed by: NURSE PRACTITIONER

## 2021-01-01 RX ORDER — ALPRAZOLAM 0.5 MG/1
0.5 TABLET ORAL DAILY PRN
Qty: 15 TABLET | Refills: 0 | Status: SHIPPED | OUTPATIENT
Start: 2021-01-01 | End: 2021-01-01 | Stop reason: SDUPTHER

## 2021-01-01 RX ORDER — ALPRAZOLAM 0.25 MG/1
0.25 TABLET ORAL NIGHTLY PRN
Qty: 15 TABLET | Refills: 0 | Status: SHIPPED | OUTPATIENT
Start: 2021-01-01 | End: 2021-01-01

## 2021-01-01 RX ORDER — ALPRAZOLAM 0.5 MG/1
0.5 TABLET ORAL DAILY PRN
Qty: 15 TABLET | Refills: 0 | Status: SHIPPED | OUTPATIENT
Start: 2021-01-01 | End: 2021-01-01

## 2021-01-01 RX ORDER — FUROSEMIDE 20 MG/1
20 TABLET ORAL DAILY
Qty: 90 TABLET | Refills: 1 | Status: ON HOLD
Start: 2021-01-01 | End: 2022-01-01 | Stop reason: HOSPADM

## 2021-01-01 RX ORDER — AMOXICILLIN 875 MG/1
875 TABLET, COATED ORAL 2 TIMES DAILY
Qty: 20 TABLET | Refills: 0 | Status: SHIPPED | OUTPATIENT
Start: 2021-01-01 | End: 2021-01-01

## 2021-01-01 RX ORDER — SULFAMETHOXAZOLE AND TRIMETHOPRIM 800; 160 MG/1; MG/1
1 TABLET ORAL 2 TIMES DAILY
Qty: 14 TABLET | Refills: 0 | Status: SHIPPED | OUTPATIENT
Start: 2021-01-01 | End: 2021-01-01

## 2021-01-01 RX ORDER — CEPHALEXIN 500 MG/1
500 CAPSULE ORAL 4 TIMES DAILY
Refills: 0 | Status: CANCELLED | OUTPATIENT
Start: 2021-01-01

## 2021-01-01 RX ORDER — TRAZODONE HYDROCHLORIDE 100 MG/1
100 TABLET ORAL NIGHTLY
Qty: 90 TABLET | Refills: 1 | Status: SHIPPED | OUTPATIENT
Start: 2021-01-01 | End: 2021-01-01 | Stop reason: SDUPTHER

## 2021-01-01 RX ORDER — TRAZODONE HYDROCHLORIDE 50 MG/1
50 TABLET ORAL NIGHTLY
Qty: 90 TABLET | Refills: 1 | Status: SHIPPED | OUTPATIENT
Start: 2021-01-01 | End: 2021-01-01 | Stop reason: SDUPTHER

## 2021-01-01 RX ORDER — DONEPEZIL HYDROCHLORIDE 10 MG/1
10 TABLET, FILM COATED ORAL NIGHTLY
Qty: 90 TABLET | Refills: 1 | Status: SHIPPED | OUTPATIENT
Start: 2021-01-01 | End: 2021-01-01 | Stop reason: SDUPTHER

## 2021-01-01 RX ORDER — TRAZODONE HYDROCHLORIDE 100 MG/1
150 TABLET ORAL NIGHTLY
Qty: 135 TABLET | Refills: 1 | Status: SHIPPED | OUTPATIENT
Start: 2021-01-01 | End: 2022-03-09

## 2021-01-01 RX ORDER — DONEPEZIL HYDROCHLORIDE 10 MG/1
10 TABLET, FILM COATED ORAL NIGHTLY
Qty: 90 TABLET | Refills: 1 | Status: SHIPPED | OUTPATIENT
Start: 2021-01-01

## 2021-01-01 RX ORDER — ALPRAZOLAM 0.5 MG/1
0.5 TABLET ORAL DAILY PRN
Qty: 15 TABLET | Refills: 0 | Status: SHIPPED | OUTPATIENT
Start: 2021-01-01 | End: 2022-01-01

## 2021-01-01 RX ORDER — DOXYCYCLINE HYCLATE 100 MG
100 TABLET ORAL 2 TIMES DAILY
Qty: 14 TABLET | Refills: 0 | Status: SHIPPED | OUTPATIENT
Start: 2021-01-01 | End: 2021-01-01

## 2021-01-01 RX ORDER — CIPROFLOXACIN 250 MG/1
250 TABLET, FILM COATED ORAL 2 TIMES DAILY
Qty: 6 TABLET | Refills: 0 | Status: SHIPPED | OUTPATIENT
Start: 2021-01-01 | End: 2021-01-01

## 2021-01-01 RX ORDER — SERTRALINE HYDROCHLORIDE 25 MG/1
25 TABLET, FILM COATED ORAL DAILY
Qty: 90 TABLET | Refills: 1 | Status: SHIPPED
Start: 2021-01-01 | End: 2021-01-01 | Stop reason: DRUGHIGH

## 2021-01-01 RX ORDER — TRAZODONE HYDROCHLORIDE 100 MG/1
100 TABLET ORAL NIGHTLY PRN
Qty: 135 TABLET | Refills: 1 | Status: SHIPPED | OUTPATIENT
Start: 2021-01-01 | End: 2021-01-01

## 2021-01-01 RX ORDER — ALPRAZOLAM 0.5 MG/1
0.5 TABLET ORAL DAILY PRN
Qty: 15 TABLET | Refills: 0 | OUTPATIENT
Start: 2021-01-01 | End: 2022-01-01

## 2021-01-01 ASSESSMENT — ENCOUNTER SYMPTOMS
VOMITING: 0
SORE THROAT: 0
ABDOMINAL PAIN: 0
NAUSEA: 0
SORE THROAT: 0
RHINORRHEA: 0
COLOR CHANGE: 1
CONSTIPATION: 0
ABDOMINAL PAIN: 0
COUGH: 0
SHORTNESS OF BREATH: 0
RHINORRHEA: 0
ABDOMINAL PAIN: 0
NAUSEA: 0
SHORTNESS OF BREATH: 0
RHINORRHEA: 0
VOMITING: 0
RHINORRHEA: 0
DIARRHEA: 0
DIARRHEA: 0
NAUSEA: 0
DIARRHEA: 0
CONSTIPATION: 0
SORE THROAT: 0
NAUSEA: 0
COUGH: 0
SHORTNESS OF BREATH: 0
VOMITING: 0
SHORTNESS OF BREATH: 0
COUGH: 0
COUGH: 0
CONSTIPATION: 0
VOMITING: 0
SORE THROAT: 0
DIARRHEA: 0
CONSTIPATION: 0
ABDOMINAL PAIN: 0

## 2021-01-08 DIAGNOSIS — I87.2 VENOUS INSUFFICIENCY (CHRONIC) (PERIPHERAL): ICD-10-CM

## 2021-01-08 DIAGNOSIS — R60.0 BILATERAL LOWER EXTREMITY EDEMA: ICD-10-CM

## 2021-01-08 DIAGNOSIS — R79.9 ABNORMAL FINDING OF BLOOD CHEMISTRY, UNSPECIFIED: ICD-10-CM

## 2021-01-08 LAB
A/G RATIO: 1.5 (ref 1.1–2.2)
ALBUMIN SERPL-MCNC: 3.9 G/DL (ref 3.4–5)
ALP BLD-CCNC: 96 U/L (ref 40–129)
ALT SERPL-CCNC: 11 U/L (ref 10–40)
ANION GAP SERPL CALCULATED.3IONS-SCNC: 10 MMOL/L (ref 3–16)
AST SERPL-CCNC: 13 U/L (ref 15–37)
BASOPHILS ABSOLUTE: 0 K/UL (ref 0–0.2)
BASOPHILS RELATIVE PERCENT: 0.4 %
BILIRUB SERPL-MCNC: 0.3 MG/DL (ref 0–1)
BUN BLDV-MCNC: 23 MG/DL (ref 7–20)
CALCIUM SERPL-MCNC: 9.5 MG/DL (ref 8.3–10.6)
CHLORIDE BLD-SCNC: 104 MMOL/L (ref 99–110)
CO2: 27 MMOL/L (ref 21–32)
CREAT SERPL-MCNC: 0.8 MG/DL (ref 0.6–1.2)
EOSINOPHILS ABSOLUTE: 0.2 K/UL (ref 0–0.6)
EOSINOPHILS RELATIVE PERCENT: 2.9 %
GFR AFRICAN AMERICAN: >60
GFR NON-AFRICAN AMERICAN: >60
GLOBULIN: 2.6 G/DL
GLUCOSE BLD-MCNC: 96 MG/DL (ref 70–99)
HCT VFR BLD CALC: 37.3 % (ref 36–48)
HEMOGLOBIN: 12.1 G/DL (ref 12–16)
LYMPHOCYTES ABSOLUTE: 1.1 K/UL (ref 1–5.1)
LYMPHOCYTES RELATIVE PERCENT: 16 %
MCH RBC QN AUTO: 29.3 PG (ref 26–34)
MCHC RBC AUTO-ENTMCNC: 32.5 G/DL (ref 31–36)
MCV RBC AUTO: 90.2 FL (ref 80–100)
MONOCYTES ABSOLUTE: 0.5 K/UL (ref 0–1.3)
MONOCYTES RELATIVE PERCENT: 7.7 %
NEUTROPHILS ABSOLUTE: 5.1 K/UL (ref 1.7–7.7)
NEUTROPHILS RELATIVE PERCENT: 73 %
PDW BLD-RTO: 14.3 % (ref 12.4–15.4)
PLATELET # BLD: 255 K/UL (ref 135–450)
PMV BLD AUTO: 8.3 FL (ref 5–10.5)
POTASSIUM SERPL-SCNC: 3.8 MMOL/L (ref 3.5–5.1)
RBC # BLD: 4.13 M/UL (ref 4–5.2)
SODIUM BLD-SCNC: 141 MMOL/L (ref 136–145)
TOTAL PROTEIN: 6.5 G/DL (ref 6.4–8.2)
WBC # BLD: 7 K/UL (ref 4–11)

## 2021-01-09 LAB
ESTIMATED AVERAGE GLUCOSE: 119.8 MG/DL
HBA1C MFR BLD: 5.8 %
T4 FREE: 1 NG/DL (ref 0.9–1.8)
TSH REFLEX: 4.43 UIU/ML (ref 0.27–4.2)

## 2021-01-22 DIAGNOSIS — I87.2 VENOUS INSUFFICIENCY: ICD-10-CM

## 2021-01-22 RX ORDER — FUROSEMIDE 20 MG/1
TABLET ORAL
Qty: 45 TABLET | Refills: 2 | Status: SHIPPED | OUTPATIENT
Start: 2021-01-22 | End: 2021-01-01

## 2021-01-22 NOTE — TELEPHONE ENCOUNTER
Last office visit 12/28/2020     Last written 12-    Next office visit scheduled Not scheduled    Requested Prescriptions     Pending Prescriptions Disp Refills    furosemide (LASIX) 20 MG tablet 45 tablet 2     Sig: Alternate 20 mg and 40 mg daily. Pt.'s  would like to talk with you about her memory loss.

## 2021-02-08 ENCOUNTER — OFFICE VISIT (OUTPATIENT)
Dept: FAMILY MEDICINE CLINIC | Age: 86
End: 2021-02-08
Payer: MEDICARE

## 2021-02-08 VITALS
SYSTOLIC BLOOD PRESSURE: 144 MMHG | DIASTOLIC BLOOD PRESSURE: 72 MMHG | HEIGHT: 63 IN | BODY MASS INDEX: 36.68 KG/M2 | WEIGHT: 207 LBS | OXYGEN SATURATION: 98 % | TEMPERATURE: 97.7 F | HEART RATE: 76 BPM

## 2021-02-08 DIAGNOSIS — R41.3 MEMORY LOSS: ICD-10-CM

## 2021-02-08 DIAGNOSIS — F03.90 DEMENTIA WITHOUT BEHAVIORAL DISTURBANCE, UNSPECIFIED DEMENTIA TYPE: Primary | ICD-10-CM

## 2021-02-08 PROCEDURE — G8417 CALC BMI ABV UP PARAM F/U: HCPCS | Performed by: PHYSICIAN ASSISTANT

## 2021-02-08 PROCEDURE — G8484 FLU IMMUNIZE NO ADMIN: HCPCS | Performed by: PHYSICIAN ASSISTANT

## 2021-02-08 PROCEDURE — 99214 OFFICE O/P EST MOD 30 MIN: CPT | Performed by: PHYSICIAN ASSISTANT

## 2021-02-08 PROCEDURE — 1123F ACP DISCUSS/DSCN MKR DOCD: CPT | Performed by: PHYSICIAN ASSISTANT

## 2021-02-08 PROCEDURE — 1036F TOBACCO NON-USER: CPT | Performed by: PHYSICIAN ASSISTANT

## 2021-02-08 PROCEDURE — G8427 DOCREV CUR MEDS BY ELIG CLIN: HCPCS | Performed by: PHYSICIAN ASSISTANT

## 2021-02-08 PROCEDURE — 4040F PNEUMOC VAC/ADMIN/RCVD: CPT | Performed by: PHYSICIAN ASSISTANT

## 2021-02-08 PROCEDURE — 1090F PRES/ABSN URINE INCON ASSESS: CPT | Performed by: PHYSICIAN ASSISTANT

## 2021-02-08 RX ORDER — DONEPEZIL HYDROCHLORIDE 5 MG/1
5 TABLET, FILM COATED ORAL NIGHTLY
Qty: 90 TABLET | Refills: 1 | Status: SHIPPED
Start: 2021-02-08 | End: 2021-01-01 | Stop reason: DRUGHIGH

## 2021-02-08 SDOH — ECONOMIC STABILITY: FOOD INSECURITY: WITHIN THE PAST 12 MONTHS, YOU WORRIED THAT YOUR FOOD WOULD RUN OUT BEFORE YOU GOT MONEY TO BUY MORE.: NEVER TRUE

## 2021-02-08 SDOH — ECONOMIC STABILITY: TRANSPORTATION INSECURITY
IN THE PAST 12 MONTHS, HAS THE LACK OF TRANSPORTATION KEPT YOU FROM MEDICAL APPOINTMENTS OR FROM GETTING MEDICATIONS?: NO

## 2021-02-08 SDOH — ECONOMIC STABILITY: INCOME INSECURITY: HOW HARD IS IT FOR YOU TO PAY FOR THE VERY BASICS LIKE FOOD, HOUSING, MEDICAL CARE, AND HEATING?: NOT HARD AT ALL

## 2021-02-08 ASSESSMENT — PATIENT HEALTH QUESTIONNAIRE - PHQ9
1. LITTLE INTEREST OR PLEASURE IN DOING THINGS: 0
SUM OF ALL RESPONSES TO PHQ QUESTIONS 1-9: 0
SUM OF ALL RESPONSES TO PHQ9 QUESTIONS 1 & 2: 0

## 2021-02-08 ASSESSMENT — ENCOUNTER SYMPTOMS
VOMITING: 0
DIARRHEA: 0
ABDOMINAL PAIN: 0
RHINORRHEA: 0
SORE THROAT: 0
COUGH: 0
SHORTNESS OF BREATH: 0
NAUSEA: 0
CONSTIPATION: 0

## 2021-02-08 NOTE — PROGRESS NOTES
2021  Nitza Negro (: 1935)  80 y.o. HPI  Follow up memory changes. Patient discussed memory changes 2020 vv with alternate provider. Similar history given today   Intermittent confusion  Usually worse at night   Occasional irritability associated with forgetfulness. Pt's partner (POA) reports pt is not a hazard for herself or others. Still able to perform adls- toileting,dressing, eating. Unable to cook. Occasional urinary incontinence- wears pads. Poor sleep, frequent naps during the day. Sleeps a couple hours then up all night. Some decreased mood. Normal appetite. Denies si/hi. Review of Systems   Constitutional: Negative for activity change, chills and fever. HENT: Negative for congestion, ear pain, rhinorrhea and sore throat. Eyes: Negative for visual disturbance. Respiratory: Negative for cough and shortness of breath. Cardiovascular: Negative for chest pain and palpitations. Gastrointestinal: Negative for abdominal pain, constipation, diarrhea, nausea and vomiting. Genitourinary: Negative for difficulty urinating and dysuria. Musculoskeletal: Negative for arthralgias and myalgias. Skin: Negative for rash. Neurological: Negative for dizziness, weakness and numbness. +memory changes   Psychiatric/Behavioral: Negative for sleep disturbance. Allergies, past medical history, family history, and social history reviewed and unchanged from previous encounter. Current Outpatient Medications   Medication Sig Dispense Refill    donepezil (ARICEPT) 5 MG tablet Take 1 tablet by mouth nightly 90 tablet 1    mupirocin (BACTROBAN) 2 % ointment Apply 3 times daily. 15 g 2    furosemide (LASIX) 20 MG tablet Alternate 20 mg and 40 mg daily. 45 tablet 2    furosemide (LASIX) 20 MG tablet Take 1 tablet by mouth daily 30 tablet 0     No current facility-administered medications for this visit.         Vitals:    21 1431 21 1439 BP: (!) 144/72 (!) 144/72   Site: Right Upper Arm Right Upper Arm   Position: Sitting Sitting   Cuff Size: Small Adult Small Adult   Pulse: 76    Temp: 97.7 °F (36.5 °C)    TempSrc: Temporal    SpO2: 98%  Comment: RA    Weight: 207 lb (93.9 kg)    Height: 5' 2.6\" (1.59 m)      Estimated body mass index is 37.14 kg/m² as calculated from the following:    Height as of this encounter: 5' 2.6\" (1.59 m). Weight as of this encounter: 207 lb (93.9 kg). Physical Exam  Constitutional:       General: She is not in acute distress. Appearance: She is well-developed. HENT:      Head: Normocephalic and atraumatic. Eyes:      Conjunctiva/sclera: Conjunctivae normal.      Pupils: Pupils are equal, round, and reactive to light. Neck:      Musculoskeletal: Neck supple. Cardiovascular:      Rate and Rhythm: Normal rate and regular rhythm. Heart sounds: Normal heart sounds. No murmur. Pulmonary:      Effort: Pulmonary effort is normal.      Breath sounds: Normal breath sounds. No wheezing. Abdominal:      General: Bowel sounds are normal.      Palpations: Abdomen is soft. Tenderness: There is no abdominal tenderness. Lymphadenopathy:      Cervical: No cervical adenopathy. Skin:     General: Skin is warm and dry. Findings: No rash. Neurological:      Mental Status: She is alert. She is confused. Deep Tendon Reflexes: Reflexes are normal and symmetric. Comments: Oriented to self only         ASSESSMENT and PLAN:  Titi Dupree was seen today for memory loss. Diagnoses and all orders for this visit:    Dementia without behavioral disturbance, unspecified dementia type (HCC)  Memory loss  -     donepezil (ARICEPT) 5 MG tablet; Take 1 tablet by mouth nightly  - memory changes likely 2/2 to worsening dementia. - I do think patient would benefit from ssri for mood but will start with aricept.  Follow up 4-6 weeks to discuss.   - Pt's POA asked to notify office if patient needs exceed care that he is able to provide. Other orders  -     mupirocin (BACTROBAN) 2 % ointment; Apply 3 times daily. Return in about 6 weeks (around 3/22/2021) for memory change.

## 2021-05-20 NOTE — PROGRESS NOTES
Boirs Ashton (:  1935) is a 80 y.o. female,Established patient, here for evaluation of the following chief complaint(s): Other (Follow up leg swelling)         ASSESSMENT/PLAN:  1. Dementia without behavioral disturbance, unspecified dementia type (HCC)  -     donepezil (ARICEPT) 10 MG tablet; Take 1 tablet by mouth nightly, Disp-90 tablet, R-1Normal  - increased dose     2. Anxiety  - New, common with worsening dementia. Trial zoloft. -   sertraline (ZOLOFT) 25 MG tablet; Take 1 tablet by mouth daily, Disp-90 tablet, R-1Normal  - I've explained to her that drugs of the SSRI class can have side effects such as weight gain, sexual dysfunction, insomnia, headache, nausea. These medications are generally effective at alleviating symptoms of anxiety and/or depression. Let me know if significant side effects do occur. 3. Venous insufficiency (chronic) (peripheral)  4. Bilateral lower extremity edema  -     furosemide (LASIX) 20 MG tablet; Take 1 tablet by mouth daily, Disp-90 tablet, R-1Normal  - continue low sodium diet and compression stockings. Return in about 4 weeks (around 2021) for Anxiety. SUBJECTIVE/OBJECTIVE:  HPI    Started on donepezil at previous appointment. Caretaker states that he has not noticed much of a difference since starting the medication. Has not gotten any worse. Worsening anxiety which is new. Sometimes gets worked, difficult to [employer] her down. \" Afraid that something will happen. Depressed because she feels like she's been left. Poor sleep-sleeps during the day has trouble falling asleep at. Normal appetite. Still with BLE. Stable on lasix. Denies redness, erythema. Review of Systems   Constitutional: Negative for activity change, chills and fever. HENT: Negative for congestion, ear pain, rhinorrhea and sore throat. Eyes: Negative for visual disturbance. Respiratory: Negative for cough and shortness of breath.     Cardiovascular: Positive for leg swelling. Negative for chest pain and palpitations. Gastrointestinal: Negative for abdominal pain, constipation, diarrhea, nausea and vomiting. Genitourinary: Negative for difficulty urinating and dysuria. Musculoskeletal: Negative for arthralgias and myalgias. Skin: Negative for rash. Neurological: Negative for dizziness, weakness and numbness. Psychiatric/Behavioral: Positive for confusion, dysphoric mood and sleep disturbance. The patient is nervous/anxious. No flowsheet data found.      Physical Exam    [INSTRUCTIONS:  \"[x]\" Indicates a positive item  \"[]\" Indicates a negative item  -- DELETE ALL ITEMS NOT EXAMINED]    Constitutional: [x] Appears well-developed and well-nourished [x] No apparent distress      [] Abnormal -     Mental status: [x] Alert and awake  [x] Oriented to person/place/time [x] Able to follow commands    [] Abnormal -     Eyes:   EOM    [x]  Normal    [] Abnormal -   Sclera  [x]  Normal    [] Abnormal -          Discharge [x]  None visible   [] Abnormal -     HENT: [x] Normocephalic, atraumatic  [] Abnormal -   [x] Mouth/Throat: Mucous membranes are moist    External Ears [x] Normal  [] Abnormal -    Neck: [x] No visualized mass [] Abnormal -     Pulmonary/Chest: [x] Respiratory effort normal   [x] No visualized signs of difficulty breathing or respiratory distress        [] Abnormal -      Musculoskeletal:   [x] Normal gait with no signs of ataxia         [x] Normal range of motion of neck        [] Abnormal -     Neurological:        [x] No Facial Asymmetry (Cranial nerve 7 motor function) (limited exam due to video visit)          [x] No gaze palsy        [] Abnormal -          Skin:        [x] No significant exanthematous lesions or discoloration noted on facial skin         [] Abnormal -            Psychiatric:       [x] Normal Affect [] Abnormal -        [x] No Hallucinations    Other pertinent observable physical exam findings:-          On this date 5/20/2021 I have spent 20 minutes reviewing previous notes, test results and face to face (virtual) with the patient discussing the diagnosis and importance of compliance with the treatment plan as well as documenting on the day of the visit. Holland Dhillon was evaluated through a synchronous (real-time) audio-video encounter. The patient (or guardian if applicable) is aware that this is a billable service. Verbal consent to proceed has been obtained within the past 12 months. The visit was conducted pursuant to the emergency declaration under the [address] [place] [address] and the [employer] Act. Patient identification was verified, and a caregiver was present when appropriate. The patient was located in a state where the provider was credentialed to provide care. An electronic signature was used to authenticate this note.     --LEVAR Mackenzie

## 2021-05-27 NOTE — TELEPHONE ENCOUNTER
Keely Garcia (ok per HIPAA) calling in behalf of patient     Evin Rosas is c/o patient is experiencing high Blood pressure. He states after or during \"an episode\" of feeling agitation, panic, anger, disorientation; her BP seems to be elevated. Pt states systolic is elevated [phone],  Jem notices elevation is Late @ night or early in morning. At other times patient's systolic is 109   Pt had a VV last Monday for dementia, Anxiety. Evin Rosas reports below BP:     05/20/21 191/95 178/85 176/71 162/82 167/85   After sleeping well 05/22 131/63 134/63 138/64   05/25/21 9:15pm 164/82 1010 150/79   05/26/21 @1:40pm 158/75 @7:30pm 156/50  @8:50pm 151/61 @10:15pm 150/80  yesterday patient was experiencing panic,anger,agitation, disorientation     Today 05/27/21 12:30pm 140/78   Jem reports patient Was very confused after waking,   Evin Rosas states when patient sleeps, she Sleeps for 14-15 hours at a time, and her BP seems to be stable if patient is sleeping well. He states that when she is calm, she takes her medication. When she is agitated does not want to take medication. Please advise.

## 2021-05-27 NOTE — TELEPHONE ENCOUNTER
Please have patient schedule office visit, with increased lethargy we should check blood work and rule out a urinary tract infection. It is common for anxiety to raise blood pressure. We can discuss what to do with her BP at her visit.

## 2021-05-28 NOTE — TELEPHONE ENCOUNTER
Pt's  declined to come in this afternoon because of transportation issues. He stated that these issues have been going on for about a year; he doesn't believe it is an uti. With Rosamaria's permission, she is scheduled on 6/2/21.

## 2021-06-02 NOTE — PROGRESS NOTES
2021  Ina Mari (: 1935)  80 y.o. HPI    Pt presents with her family member (POA)  He reports acute memory changes in the last few days  She will go extended periods without sleep and will then become anxious and agitated  Profoundly confused  Denies fever, cough, n/v/d  Taking medication as prescribed. Pt's poa does not fee that patient is a hazard to herself or others at this time. He has taken measures to secure their home. Review of Systems   Constitutional: Negative for activity change, chills and fever. HENT: Negative for congestion, ear pain, rhinorrhea and sore throat. Eyes: Negative for visual disturbance. Respiratory: Negative for cough and shortness of breath. Cardiovascular: Negative for chest pain and palpitations. Gastrointestinal: Negative for abdominal pain, constipation, diarrhea, nausea and vomiting. Genitourinary: Positive for dysuria and frequency. Negative for difficulty urinating. Musculoskeletal: Negative for arthralgias and myalgias. Skin: Negative for rash. Neurological: Positive for weakness. Negative for dizziness and numbness. Psychiatric/Behavioral: Positive for agitation, behavioral problems and confusion. Negative for sleep disturbance. Allergies, past medical history, family history, and social history reviewed and unchanged from previous encounter. Current Outpatient Medications   Medication Sig Dispense Refill    furosemide (LASIX) 20 MG tablet Take 1 tablet by mouth daily 90 tablet 1    donepezil (ARICEPT) 10 MG tablet Take 1 tablet by mouth nightly 90 tablet 1    sertraline (ZOLOFT) 50 MG tablet Take 1 tablet by mouth daily 90 tablet 1    ALPRAZolam (XANAX) 0.25 MG tablet Take 1 tablet by mouth nightly as needed for Sleep or Anxiety for up to 30 days. May cause drowsiness. 15 tablet 0     No current facility-administered medications for this visit.        Vitals:    21 1451   BP: 138/82   Site: Right Upper Arm Position: Sitting   Cuff Size: Large Adult   Pulse: 78   Temp: 98.3 °F (36.8 °C)   TempSrc: Oral   SpO2: 98%   Weight: 201 lb 9.6 oz (91.4 kg)   Height: 5' 1\" (1.549 m)     Estimated body mass index is 38.09 kg/m² as calculated from the following:    Height as of this encounter: 5' 1\" (1.549 m). Weight as of this encounter: 201 lb 9.6 oz (91.4 kg). Physical Exam  Constitutional:       Appearance: Normal appearance. She is obese. Cardiovascular:      Rate and Rhythm: Normal rate and regular rhythm. Heart sounds: Normal heart sounds. Neurological:      Mental Status: She is disoriented. Motor: Weakness present. ASSESSMENT and PLAN:  Silvia Banerjee was seen today for altered mental status. Diagnoses and all orders for this visit:    Confusion  Leukocytes in urine  -     POCT Urinalysis no Micro- trace blood, small leuks, neg nitrite  - await culture prior to treatment. -     Culture, Urine      Return if symptoms worsen or fail to improve.

## 2021-06-04 NOTE — TELEPHONE ENCOUNTER
Please call to let patient know that urine was positive for infection. I have sent in an antibiotic to the pharmacy.

## 2021-06-04 NOTE — TELEPHONE ENCOUNTER
I attempted to call Chapito Covarrubias on both of her numbers and neither of them were able to take a voicemail. I called Yiss[facility] Jem and left a voicemail requesting that he call the office back to go over this.

## 2021-06-07 NOTE — TELEPHONE ENCOUNTER
Please inform pharmacy that we do not have any allergies listed and pt has verified that she has no allergy. ADDENDUM- per chart review her last PCP had PCN and erythromycin in her allergy list. Due to dementia, pt unable to verify reaction. Please update. I will send in an alternative medication. Doxycyline sent.

## 2021-06-07 NOTE — TELEPHONE ENCOUNTER
----- Message from [facility] sent at 6/4/2021  5:45 PM EDT -----  Subject: Results Request    QUESTIONS  Which lab or imaging result is the patient calling about? urine test   Which provider ordered the test?   At what location was the test performed? Date the test was performed? Additional Information for Provider? Pt POA on file is returning a call in   regards to pt urine test. He hung up the phone on me before I could verify   his number. ---------------------------------------------------------------------------  --------------  John VILLASEÑOR  What is the best way for the office to contact you? Do not leave any   message, patient will call back for answer  Preferred Call Back Phone Number?  [phone]

## 2021-06-10 NOTE — TELEPHONE ENCOUNTER
Abebe Miller, states that pt has NO allergies. He asked that this be removed from her chart. Adv will send message back about it. He said he's picked up the doxycycline.

## 2021-06-17 PROBLEM — F03.918 DEMENTIA WITH BEHAVIORAL DISTURBANCE: Status: ACTIVE | Noted: 2021-02-08

## 2021-06-17 NOTE — PROGRESS NOTES
Jovon Russell (:  1935) is a 80 y.o. female,Established patient, here for evaluation of the following chief complaint(s): Anxiety         ASSESSMENT/PLAN:  1. Dementia with behavioral disturbance, unspecified dementia type ([facility] [address]  2. Anxiety  3. Psychophysiological insomnia-     sertraline (ZOLOFT) 50 MG tablet; Take 1 tablet by mouth daily, Disp-90 tablet, R-1Normal  - increase zoloft dose  - pt may benefit from PRN low dose xanax for sundowning episodes. Lengthy discuss with pt and POA regarding risks associated with benzodiazepines including lethargy, fatigue, increased risk of falls, and chemical dependence. Poa voiced understanding. He will come into the office to sign medication contract. Return in about 3 months (around 2021) for Anxiety, Controlled Med Management. SUBJECTIVE/OBJECTIVE:  HPI  Pt is hard of hearing and demented, defers to POA for most of HPI. Follow up dementia with behavioral disturbance. Started on zoloft and donepezil. No noticeable improvement in symptoms. Still with sundowning episodes. Patient will stay awake for 2 days without sleep  Becomes angry  Not physically violent, does not try to leave the house. Lives with her [place] (legal paperwork in media). Review of Systems   Constitutional: Positive for activity change and fatigue. Neurological: Negative for headaches. Psychiatric/Behavioral: Positive for agitation, behavioral problems and sleep disturbance. The patient is nervous/anxious. No flowsheet data found.      Physical Exam    [INSTRUCTIONS:  \"[x]\" Indicates a positive item  \"[]\" Indicates a negative item  -- DELETE ALL ITEMS NOT EXAMINED]    Constitutional: [x] Appears well-developed and well-nourished [x] No apparent distress      [] Abnormal -     Mental status: [x] Alert and awake  [x] Oriented to person/place/time [x] Able to follow commands    [] Abnormal -     Eyes:   EOM    [x]  Normal    [] Abnormal -   Sclera [x]  Normal    [] Abnormal -          Discharge [x]  None visible   [] Abnormal -     HENT: [x] Normocephalic, atraumatic  [] Abnormal -   [x] Mouth/Throat: Mucous membranes are moist    External Ears [x] Normal  [] Abnormal -    Neck: [x] No visualized mass [] Abnormal -     Pulmonary/Chest: [x] Respiratory effort normal   [x] No visualized signs of difficulty breathing or respiratory distress        [] Abnormal -      Musculoskeletal:   [] Normal gait with no signs of ataxia         [x] Normal range of motion of neck        [] Abnormal -     Neurological:        [x] No Facial Asymmetry (Cranial nerve 7 motor function) (limited exam due to video visit)          [x] No gaze palsy        [] Abnormal -          Skin:        [x] No significant exanthematous lesions or discoloration noted on facial skin         [] Abnormal -            Psychiatric:       [x] Normal Affect [x] Abnormal - pleasantly confused      Other pertinent observable physical exam findings:-          On this date 6/17/2021 I have spent 20 minutes reviewing previous notes, test results and face to face (virtual) with the patient discussing the diagnosis and importance of compliance with the treatment plan as well as documenting on the day of the visitLanny Hunter, was evaluated through a synchronous (real-time) audio-video encounter. The patient (or guardian if applicable) is aware that this is a billable service. Verbal consent to proceed has been obtained within the past 12 months. The visit was conducted pursuant to the emergency declaration under the [address] [place] [address] and the [employer] General Act. Patient identification was verified, and a caregiver was present when appropriate. The patient was located in a state where the provider was credentialed to provide care.       An electronic signature was used to authenticate this note.    --LEVAR Lebron

## 2021-08-11 NOTE — TELEPHONE ENCOUNTER
I received a call from Leni Painting (on HIPAA) requesting that Yissel's Alprazolam be refilled. He is also requesting that her dosage be increased from 0.25mg to 0.5mg, stating that the 0.25mg does not do much for her. Requesting a return call at [phone].

## 2021-08-12 NOTE — TELEPHONE ENCOUNTER
Spoke with Matias Barraza (on HIPAA) and relayed message, he didn't say anything about wanting a Neurologist at the moment.

## 2021-08-12 NOTE — TELEPHONE ENCOUNTER
Dose increased to 0.5 mg tablet daily as needed for episodes of agitation. Still not intended for daily use. If he would like a referral to a neurologist to help with behaviors, please let me know.

## 2021-09-20 NOTE — PROGRESS NOTES
Altha Gosselin (:  1935) is a 80 y.o. female,Established patient, here for evaluation of the following chief complaint(s): Anxiety and Discuss Medications         ASSESSMENT/PLAN:  1. Dementia with behavioral disturbance, unspecified dementia type (HCC)  -     ALPRAZolam (XANAX) 0.5 MG tablet; Take 1 tablet by mouth daily as needed for Anxiety (daily) for up to 30 days. , Disp-15 tablet, R-0Normal  - Oarrs reviewed, effective, continue current regimen   2. Psychophysiological insomnia  -     traZODone (DESYREL) 50 MG tablet; Take 1 tablet by mouth nightly, Disp-90 tablet, R-1Normal  - trial trazodone ,if ineffective would consider seroquel - will need discussion of risks. Return in about 3 months (around 2021) for Controlled Med Management (office visit). SUBJECTIVE/OBJECTIVE:  HPI    Pt is hard of hearing and demented, defers to POA for most of HPI. Lives with her [place] (legal paperwork in media).      Follow up dementia with behavioral disturbance. Started on zoloft, donepezil, and xanax. Noticeable improvement in symptoms. Sundowning epsidoes are fewer. Xanax will help with these episodes, uses xanax periodically when redirecting does not work. Will still have occasional episode where patient will stay awake for extended period of time without sleep. Not physically violent, does not try to leave the house. Review of Systems   Constitutional: Negative for activity change, chills and fever. HENT: Negative for congestion, ear pain, rhinorrhea and sore throat. Eyes: Negative for visual disturbance. Respiratory: Negative for cough and shortness of breath. Cardiovascular: Negative for chest pain and palpitations. Gastrointestinal: Negative for abdominal pain, constipation, diarrhea, nausea and vomiting. Genitourinary: Negative for difficulty urinating and dysuria. Musculoskeletal: Negative for arthralgias and myalgias. Skin: Negative for rash. Neurological: Negative for dizziness, weakness and numbness. Psychiatric/Behavioral: Positive for agitation, behavioral problems and sleep disturbance. The patient is nervous/anxious. No flowsheet data found. Physical Exam    [INSTRUCTIONS:  \"[x]\" Indicates a positive item  \"[]\" Indicates a negative item  -- DELETE ALL ITEMS NOT EXAMINED]    Constitutional: [x] Appears well-developed and well-nourished [x] No apparent distress      [] Abnormal -     Mental status: [x] Alert and awake  [x] Oriented to person/place/time [x] Able to follow commands    [] Abnormal -     Eyes:   EOM    [x]  Normal    [] Abnormal -   Sclera  [x]  Normal    [] Abnormal -          Discharge [x]  None visible   [] Abnormal -     HENT: [x] Normocephalic, atraumatic  [] Abnormal -   [x] Mouth/Throat: Mucous membranes are moist    External Ears [x] Normal  [] Abnormal -    Neck: [x] No visualized mass [] Abnormal -     Pulmonary/Chest: [x] Respiratory effort normal   [x] No visualized signs of difficulty breathing or respiratory distress        [] Abnormal -      Musculoskeletal:   [x] Normal gait with no signs of ataxia         [x] Normal range of motion of neck        [] Abnormal -     Neurological:        [x] No Facial Asymmetry (Cranial nerve 7 motor function) (limited exam due to video visit)          [x] No gaze palsy        [] Abnormal -          Skin:        [x] No significant exanthematous lesions or discoloration noted on facial skin         [] Abnormal -            Psychiatric:       [x] Normal Affect [] Abnormal -        [x] No Hallucinations    Other pertinent observable physical exam findings:-          On this date 9/20/2021 I have spent 15 minutes reviewing previous notes, test results and face to face (virtual) with the patient discussing the diagnosis and importance of compliance with the treatment plan as well as documenting on the day of the visit.       Nancy Harp, was evaluated through a synchronous (real-time) audio-video encounter. The patient (or guardian if applicable) is aware that this is a billable service. Verbal consent to proceed has been obtained within the past 12 months. The visit was conducted pursuant to the emergency declaration under the [facility], [address] [place] [address] authority and the [employer] General Act. Patient identification was verified, and a caregiver was present when appropriate. The patient was located in a state where the provider was credentialed to provide care. An electronic signature was used to authenticate this note.     --LEVAR Stephenson

## 2021-09-22 NOTE — TELEPHONE ENCOUNTER
Pts  calling because pt was seen 9/20 vv with Shazia Barry and he stated that pt was suppose to get her \" sleeping pill and xanax\" refilled to the [address] in [facility]

## 2021-10-25 NOTE — TELEPHONE ENCOUNTER
Jem,friend on [facility], calling requesting a refill of patients Xanax. He also reports the Trazodone 50 mg helps her sleep better but it does not \"knock her out\" he would like to increase the Trazodone to 50 mg bid to help her relax and then maybe she could cut down on the Xanax. Refill Request - Controlled Substance    Last Seen Department: 9/20/2021  Last Seen by PCP: 9/20/2021    Last Written: 9/22/21 #15    Last UDS: NA    Med Agreement Signed On: 6/17/2021    Next Appointment: Visit date not found      Requested Prescriptions     Pending Prescriptions Disp Refills    ALPRAZolam (XANAX) 0.5 MG tablet 15 tablet 0     Sig: Take 1 tablet by mouth daily as needed for Anxiety (daily) for up to 30 days.

## 2021-10-28 NOTE — TELEPHONE ENCOUNTER
Trazodone should only be taken at night as this will make the patient sleepy, but can be increased to 100 mg nightly- can take two 50 mg tablets.

## 2021-11-22 NOTE — TELEPHONE ENCOUNTER
Jem ( pts brother) calling asking if pt can get an apt with Romeo Ansari sooner that the beginning of the year, due to pt has been having \" episodes and Nasreen Shepherd wanted to talk to Romeo Ansari about it. Is there a sooner apt pt can be scheduled.  Please Advise

## 2021-11-22 NOTE — TELEPHONE ENCOUNTER
Refill Request - Controlled Substance    Last Seen Department: 9/20/2021  Last Seen by PCP: 9/20/2021    Last Written: 10/25/2021 and 5/20/2021    Last UDS: No UDS on File at this time. Med Agreement Signed On: 6/17/2021    Next Appointment: Visit date not found    Please advise when patient is due for a follow up appointment. Requested Prescriptions     Pending Prescriptions Disp Refills    ALPRAZolam (XANAX) 0.5 MG tablet 15 tablet 0     Sig: Take 1 tablet by mouth daily as needed for Anxiety (daily) for up to 30 days.     donepezil (ARICEPT) 10 MG tablet 90 tablet 1     Sig: Take 1 tablet by mouth nightly

## 2021-11-22 NOTE — TELEPHONE ENCOUNTER
Pt calling to have her Aricept and Xanax refilled to the [address] in [facility]

## 2021-11-24 NOTE — TELEPHONE ENCOUNTER
Pt. Spouse states you increased Trazodone to 100mg and they ran out of the medication early.   RX adjusted to 100mg

## 2021-12-03 NOTE — PROGRESS NOTES
2021  Maurisio Charles (: 1935)  80 y.o.    ASSESSMENT and PLAN:  Lexus Garcia was seen today for medication check. Diagnoses and all orders for this visit:    Acquired cerebral ventriculomegaly [facility])  Commun[facility]  -     MRI BRAIN W WO CONTRAST; Future  - will likely need neuro follow up    Dementia with behavioral disturbance, unspecified dementia type ([facility] [address]  -     CBC; Future  -     COMPREHENSIVE METABOLIC PANEL; Future  -     MAGNESIUM; Future  -     TSH with Reflex; Future  - on xanax and zoloft. Discussed adding seroquel to assist with behaviors and sleep, but will place patient at increased risk for CVA. [address] declines at this time. Swelling of both lower extremities  - at baseline. Continue lasix. Psychophysiological insomnia  -     traZODone (DESYREL) 100 MG tablet; Take 1 tablet by mouth nightly as needed for Sleep  -     TSH with Reflex; Future  - increase trazodone to 150 mg nightly for sleep    Anxiety disorder due to known physiological condition   -     TSH with Reflex; Future    Altered mental status, unspecified altered mental status type  -     POCT Urinalysis no Micro - unable to give sample. Supplies given to collect sample at home, will return for ua. Return if symptoms worsen or fail to improve. JANEY Butt, presents with patient for evaluation of change in mental status. States that she is pleasantly confused most of the time, but will have \"episodes\" where she is yelling and combative. Not a danger to herself or others though. Will go extended periods without sleep. Sometimes will respond to xanax, sometimes will not. [address] is also wondering if this is due to \"pressure on the brain. \" states he was told by her last provider that she had pressure of the brain and should see neurology. She denies headache, vision changes, falls (usually in wheelchair), nausea/vomiting. Has good appetite.      CT head 2019  No acute intracranial abnormality.       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  Findings can be seen   in normal pressure hydrocephalus if in the correct clinical setting.       Mild parenchymal volume loss.       Moderate chronic microvascular disease. Review of Systems   Constitutional: Negative for activity change, chills and fever. HENT: Negative for congestion, ear pain, rhinorrhea and sore throat. Eyes: Negative for visual disturbance. Respiratory: Negative for cough and shortness of breath. Cardiovascular: Negative for chest pain and palpitations. Gastrointestinal: Negative for abdominal pain, constipation, diarrhea, nausea and vomiting. Genitourinary: Positive for frequency. Negative for difficulty urinating and dysuria. Musculoskeletal: Negative for arthralgias and myalgias. Skin: Negative for rash. Neurological: Negative for dizziness, weakness and numbness. Psychiatric/Behavioral: Positive for agitation, confusion, hallucinations and sleep disturbance. Allergies, past medical history, family history, and social history reviewed and unchanged from previous encounter. Current Outpatient Medications   Medication Sig Dispense Refill    donepezil (ARICEPT) 10 MG tablet Take 1 tablet by mouth nightly 90 tablet 1    furosemide (LASIX) 20 MG tablet Take 1 tablet by mouth daily 90 tablet 1    ALPRAZolam (XANAX) 0.5 MG tablet Take 1 tablet by mouth daily as needed for Anxiety (daily) for up to 30 days. 15 tablet 0    sertraline (ZOLOFT) 50 MG tablet Take 1 tablet by mouth daily 90 tablet 1    traZODone (DESYREL) 100 MG tablet Take 1.5 tablets by mouth nightly 135 tablet 1     No current facility-administered medications for this visit.        Vitals:    12/03/21 1330   BP: 132/84   Site: Right Upper Arm   Position: Sitting   Cuff Size: Large Adult   Pulse: 72   Temp: 97.6 °F (36.4 °C)   TempSrc: Oral   SpO2: 97%   Weight: 204 lb (92.5 kg)   Height: 5' 1\" (1.549 m)     Estimated body mass index is 38.55 kg/m² as calculated from the following:    Height as of this encounter: 5' 1\" (1.549 m). Weight as of this encounter: 204 lb (92.5 kg). Physical Exam  Constitutional:       Appearance: Normal appearance. She is obese. HENT:      Head: Normocephalic and atraumatic. Cardiovascular:      Rate and Rhythm: Normal rate and regular rhythm. Pulses: Normal pulses. Heart sounds: Normal heart sounds. Pulmonary:      Breath sounds: Normal breath sounds. Abdominal:      General: Abdomen is flat. Palpations: Abdomen is soft. Musculoskeletal:      Right lower leg: Edema present. Left lower leg: Edema present. Neurological:      Mental Status: She is alert. Mental status is at baseline. She is confused. Psychiatric:         Mood and Affect: Mood is anxious. Speech: Speech normal.         Behavior: Behavior is cooperative. Cognition and Memory: Cognition is impaired. Memory is impaired. Judgment: Judgment is impulsive.

## 2021-12-07 NOTE — TELEPHONE ENCOUNTER
Reviewed Rosamaria's note.  Will leave for her to review and resend if she meant to send in a higher dose

## 2021-12-07 NOTE — TELEPHONE ENCOUNTER
Pts  calling about pts trazodone, pharmacy told pts  that they cannot fill medication due to it being the same dose and instructions from the rx on 11/24, and would need to rewrite the medication.  Please Advise

## 2021-12-15 NOTE — TELEPHONE ENCOUNTER
Ariadna Christian, on hippa calling to ask if patient would qualify for any help with ADL's through [facility] Home health aid \"at least twice a week\". He also would like to know if his plan covers any mental health help. Would she be a candidate for care coordination?

## 2021-12-22 NOTE — TELEPHONE ENCOUNTER
Refill Request - Controlled Substance    Last Seen Department: 12/3/2021  Last Seen by PCP: 12/3/2021    Last Written: xanax 11/23/2021, zoloft 6/17/2021    Last UDS: N/A    Med Agreement Signed On: 6/17/2021    Next Appointment: Visit date not found      Requested Prescriptions     Pending Prescriptions Disp Refills    ALPRAZolam (XANAX) 0.5 MG tablet 15 tablet 0     Sig: Take 1 tablet by mouth daily as needed for Anxiety (daily) for up to 30 days.     sertraline (ZOLOFT) 50 MG tablet 90 tablet 1     Sig: Take 1 tablet by mouth daily

## 2021-12-22 NOTE — TELEPHONE ENCOUNTER
From: Greta Schaeffer  To: Fred Mcneil  Sent: 12/22/2021 12:03 PM EST  Subject: Moe Boucher has an sertraline prescription that has ran out, i been giving her two 25 mg from before, can you refill it. Also her Alprazolam is about due as well.  Thank you and have a great [address]

## 2021-12-23 NOTE — PROGRESS NOTES
Wendie Goldmann (:  1935) is a 80 y.o. female,Established patient, here for evaluation of the following chief complaint(s): Wound Infection (small draining abscess with red surrounding skin)         ASSESSMENT/PLAN:  1. Cellulitis of right anterior lower leg  The following orders have not been finalized:  -     sulfamethoxazole-trimethoprim (BACTRIM DS;SEPTRA DS) 800-160 MG per tablet  2. Abscess of right lower leg   Will order antibiotics and recommend close follow up with pcp next week. Continue with application of clean sterile dressing and compression bandages. Return in about 1 week (around 2021) for wound check. SUBJECTIVE/OBJECTIVE:  HPI patient presents with assistance from Lanie Bush, her caregiver, for evaluation of a skin abscess on her right leg. Lanie Tobin noticed she had a lesion on her leg yesterday, and when he removed the lose skin covering it, there was an open wound present, and the tissue surrounding it was red, warm, and draining. He attempted to take her to the ER for evaluation, but there were so many people there and they waited so long that they left. Lanie Bush has been applying an absorbant dressing and compression dressings to her legs for chronic edema. No other treatments have been attempted. Review of Systems   Constitutional: Negative for fatigue and fever. Skin: Positive for color change and wound. Negative for rash. No flowsheet data found.      Physical Exam    [INSTRUCTIONS:  \"[x]\" Indicates a positive item  \"[]\" Indicates a negative item  -- DELETE ALL ITEMS NOT EXAMINED]    Constitutional: [x] Appears well-developed and well-nourished [x] No apparent distress      [] Abnormal -     Mental status: [x] Alert and awake  [] Oriented to person/place/time [x] Able to follow commands    [] Abnormal -       HENT: [x] Normocephalic, atraumatic  [] Abnormal -       External Ears [x] Normal  [] Abnormal -    Neck: [x] No visualized mass [] Abnormal - Pulmonary/Chest: [x] Respiratory effort normal   [x] No visualized signs of difficulty breathing or respiratory distress        [] Abnormal -       Neurological:        [x] No Facial Asymmetry (Cranial nerve 7 motor function) (limited exam due to video visit)          [x] No gaze palsy        [] Abnormal -          Skin:                 [x] Abnormal -Right shin:  approximately 1cm oval wound, granulation tissue visible, copious serosanguinous drainage, erythema and edema in surround tissue extending several cm               Other pertinent observable physical exam findings:- patient reclining in chair          On this date 12/23/2021 I have spent 20 minutes reviewing previous notes, test results and face to face (virtual) with the patient discussing the diagnosis and importance of compliance with the treatment plan as well as documenting on the day of the visit. Norma Chang, was evaluated through a synchronous (real-time) audio-video encounter. The patient (or guardian if applicable) is aware that this is a billable service. Verbal consent to proceed has been obtained within the past 12 months. The visit was conducted pursuant to the emergency declaration under the [address] [place] [address], [address] [place] [address] authority and the [employer] and [employer] General Act. Patient identification was verified, and a caregiver was present when appropriate. The patient was located in a state where the provider was credentialed to provide care. An electronic signature was used to authenticate this note.     --SAUL Krueger - CNP

## 2021-12-23 NOTE — TELEPHONE ENCOUNTER
----- Message from Joyce Brice sent at 12/23/2021  8:44 AM EST -----  Subject: Appointment Request    Reason for Call: Urgent (Patient Request) No Script    QUESTIONS  Type of Appointment? Established Patient  Reason for appointment request? Available appointments did not meet   patient need  Additional Information for Provider? Jackeline Wei called and said he took   patient to the ER because patient has a cut on her right leg that has a   abscess under it. He ask can he bring her in today to be seen. If patient   can't be seen he ask can pcp prescribe a antibiotic for the patient. Please call Jerod gibbs and advise.  ---------------------------------------------------------------------------  --------------  [employer] INFO  What is the best way for the office to contact you? OK to leave message on   voicemail  Preferred Call Back Phone Number? [phone]  ---------------------------------------------------------------------------  --------------  SCRIPT ANSWERS  Relationship to Patient? Other  Representative Name? jerod  Additional information verified (besides Name and Date of Birth)? Address  (Is the patient requesting to see the provider for a procedure?)? No  (Is the patient requesting to see the provider urgently  today or   tomorrow. )? Yes  Have you been diagnosed with, awaiting test results for, or told that you   are suspected of having COVID-19 (Coronavirus)? (If patient has tested   negative or was tested as a requirement for work, school, or travel and   not based on symptoms, answer no)? No  Within the past two weeks have you developed any of the following symptoms   (answer no if symptoms have been present longer than 2 weeks or began   more than 2 weeks ago)?  Fever or Chills, Cough, Shortness of breath or   difficulty breathing, Loss of taste or smell, Sore throat, Nasal   congestion, Sneezing or runny nose, Fatigue or generalized body aches   (answer no if pain is specific to a body part e.g. back pain), Diarrhea,   Headache? No  Have you had close contact with someone with COVID-19 in the last 14 days? No  (Service Expert  click yes below to proceed with [employer] As Usual   Scheduling)?  Yes

## 2021-12-23 NOTE — TELEPHONE ENCOUNTER
Edi Blank, see below. No one has any openings, are you OK with patient scheduling a VV with someone on the Virtualist Team if she can?

## 2021-12-23 NOTE — TELEPHONE ENCOUNTER
Scheduled as below    Future Appointments   Date Time Provider Lyly Neha   12/23/2021  1:00 PM Yesenia Huerta, APRN - CNP [facility] VIRTUAL MMA       Caregiver concerned d/t leg swelling and cut /skin tear. Believes there may be an 'abscess' under cut. Would like eval.  LWBS at ED yesterday d/t wait times and COVID cases.     Advised we would have NP with Virtual available and he is in agreement

## 2021-12-27 NOTE — TELEPHONE ENCOUNTER
Pt saw Whit Garcia 12/23 and she suggested wound care for the pt. Anahi White was calling to see if pt can get an order for pt to start wound care and is also wondering if pts insurance will cover it.  Please Advise

## 2021-12-27 NOTE — TELEPHONE ENCOUNTER
.  Refill Request - Controlled Substance    Last Seen Department: 12/3/2021  Last Seen by PCP: 12/3/2021    Last Written: 11/23/21 15 with 0     Last UDS: no uds on file     Med Agreement Signed On: 6/17/21    Next Appointment: 12/27/2021      Requested Prescriptions     Pending Prescriptions Disp Refills    ALPRAZolam (XANAX) 0.5 MG tablet 15 tablet 0     Sig: Take 1 tablet by mouth daily as needed for Anxiety (daily) for up to 30 days.

## 2021-12-27 NOTE — TELEPHONE ENCOUNTER
Please call pt/Jem to see if he would be able to send a picture through [employer] and then we can see if it is appropriate for [facility] wound care.

## 2021-12-28 NOTE — TELEPHONE ENCOUNTER
Mckenna Fletcher states he will take a picture and send through my chart. He states when he first noticed the wound it looked like a blister. He touched the blister and water came out and the blister opened. He said there way an open hole under the blister. He has been treating it with Iodine and oral AB. He states it is not \"green\" so he thinks it is doing ok.   States he does not think anyone needs to come out and take care of it but thinks someone should come once in a while to make sure it is healing

## 2021-12-30 NOTE — TELEPHONE ENCOUNTER
Because of how deep it is, I definitely recommend follow up with wound care. I have placed the referral.     Referral placed.      Nasima Yousif Dr.  [place], [address] [facility]  [phone]

## 2021-12-30 NOTE — TELEPHONE ENCOUNTER
From: Carol Yeung  To: Monika Reaves  Sent: 12/30/2021 1:18 PM EST  Subject: Lucys leg pic     picture of her leg , i have using peroxide and iodine only to clean it. a

## 2022-01-01 ENCOUNTER — PATIENT MESSAGE (OUTPATIENT)
Dept: FAMILY MEDICINE CLINIC | Age: 87
End: 2022-01-01

## 2022-01-01 ENCOUNTER — TELEPHONE (OUTPATIENT)
Dept: FAMILY MEDICINE CLINIC | Age: 87
End: 2022-01-01

## 2022-01-01 ENCOUNTER — APPOINTMENT (OUTPATIENT)
Dept: GENERAL RADIOLOGY | Age: 87
DRG: 177 | End: 2022-01-01
Payer: MEDICARE

## 2022-01-01 ENCOUNTER — APPOINTMENT (OUTPATIENT)
Dept: VASCULAR LAB | Age: 87
DRG: 177 | End: 2022-01-01
Payer: MEDICARE

## 2022-01-01 ENCOUNTER — HOSPITAL ENCOUNTER (OUTPATIENT)
Dept: WOUND CARE | Age: 87
Discharge: HOME | End: 2022-01-18

## 2022-01-01 ENCOUNTER — HOSPITAL ENCOUNTER (INPATIENT)
Age: 87
LOS: 4 days | Discharge: HOSPICE | DRG: 177 | End: 2022-02-14
Attending: EMERGENCY MEDICINE | Admitting: INTERNAL MEDICINE
Payer: MEDICARE

## 2022-01-01 ENCOUNTER — VIRTUAL VISIT (OUTPATIENT)
Dept: FAMILY MEDICINE CLINIC | Age: 87
End: 2022-01-01
Payer: MEDICARE

## 2022-01-01 VITALS
BODY MASS INDEX: 34.04 KG/M2 | DIASTOLIC BLOOD PRESSURE: 83 MMHG | OXYGEN SATURATION: 82 % | WEIGHT: 180.3 LBS | RESPIRATION RATE: 26 BRPM | HEART RATE: 86 BPM | SYSTOLIC BLOOD PRESSURE: 169 MMHG | TEMPERATURE: 98.8 F | HEIGHT: 61 IN

## 2022-01-01 DIAGNOSIS — R11.2 NON-INTRACTABLE VOMITING WITH NAUSEA, UNSPECIFIED VOMITING TYPE: ICD-10-CM

## 2022-01-01 DIAGNOSIS — R05.9 COUGH: ICD-10-CM

## 2022-01-01 DIAGNOSIS — U07.1 COVID-19: ICD-10-CM

## 2022-01-01 DIAGNOSIS — F03.91 DEMENTIA WITH BEHAVIORAL DISTURBANCE, UNSPECIFIED DEMENTIA TYPE: ICD-10-CM

## 2022-01-01 DIAGNOSIS — R53.1 GENERAL WEAKNESS: Primary | ICD-10-CM

## 2022-01-01 DIAGNOSIS — R41.82 ALTERED MENTAL STATUS, UNSPECIFIED ALTERED MENTAL STATUS TYPE: Primary | ICD-10-CM

## 2022-01-01 LAB
A/G RATIO: 1 (ref 1.1–2.2)
A/G RATIO: 1 (ref 1.1–2.2)
A/G RATIO: 1.4 (ref 1.1–2.2)
ALBUMIN SERPL-MCNC: 3 G/DL (ref 3.4–5)
ALBUMIN SERPL-MCNC: 3.2 G/DL (ref 3.4–5)
ALBUMIN SERPL-MCNC: 3.7 G/DL (ref 3.4–5)
ALP BLD-CCNC: 113 U/L (ref 40–129)
ALP BLD-CCNC: 115 U/L (ref 40–129)
ALP BLD-CCNC: 126 U/L (ref 40–129)
ALT SERPL-CCNC: 43 U/L (ref 10–40)
ALT SERPL-CCNC: 55 U/L (ref 10–40)
ALT SERPL-CCNC: 62 U/L (ref 10–40)
AMORPHOUS: ABNORMAL /HPF
ANION GAP SERPL CALCULATED.3IONS-SCNC: 11 MMOL/L (ref 3–16)
ANION GAP SERPL CALCULATED.3IONS-SCNC: 13 MMOL/L (ref 3–16)
ANION GAP SERPL CALCULATED.3IONS-SCNC: 9 MMOL/L (ref 3–16)
APTT: 35.1 SEC (ref 26.2–38.6)
AST SERPL-CCNC: 39 U/L (ref 15–37)
AST SERPL-CCNC: 46 U/L (ref 15–37)
AST SERPL-CCNC: 58 U/L (ref 15–37)
BACTERIA: ABNORMAL /HPF
BASOPHILS ABSOLUTE: 0 K/UL (ref 0–0.2)
BASOPHILS RELATIVE PERCENT: 0 %
BASOPHILS RELATIVE PERCENT: 0.1 %
BASOPHILS RELATIVE PERCENT: 0.1 %
BILIRUB SERPL-MCNC: 0.5 MG/DL (ref 0–1)
BILIRUB SERPL-MCNC: 0.5 MG/DL (ref 0–1)
BILIRUB SERPL-MCNC: 0.8 MG/DL (ref 0–1)
BILIRUBIN URINE: NEGATIVE
BLOOD CULTURE, ROUTINE: NORMAL
BLOOD, URINE: NEGATIVE
BUN BLDV-MCNC: 25 MG/DL (ref 7–20)
BUN BLDV-MCNC: 31 MG/DL (ref 7–20)
BUN BLDV-MCNC: 33 MG/DL (ref 7–20)
C-REACTIVE PROTEIN: 112.2 MG/L (ref 0–5.1)
C-REACTIVE PROTEIN: 60.8 MG/L (ref 0–5.1)
CALCIUM SERPL-MCNC: 8.8 MG/DL (ref 8.3–10.6)
CALCIUM SERPL-MCNC: 9 MG/DL (ref 8.3–10.6)
CALCIUM SERPL-MCNC: 9 MG/DL (ref 8.3–10.6)
CHLORIDE BLD-SCNC: 104 MMOL/L (ref 99–110)
CHLORIDE BLD-SCNC: 106 MMOL/L (ref 99–110)
CHLORIDE BLD-SCNC: 110 MMOL/L (ref 99–110)
CHOLESTEROL, TOTAL: 196 MG/DL (ref 0–199)
CLARITY: CLEAR
CO2: 23 MMOL/L (ref 21–32)
CO2: 25 MMOL/L (ref 21–32)
CO2: 26 MMOL/L (ref 21–32)
COLOR: ABNORMAL
CREAT SERPL-MCNC: 0.6 MG/DL (ref 0.6–1.2)
CREAT SERPL-MCNC: 0.7 MG/DL (ref 0.6–1.2)
CREAT SERPL-MCNC: 0.8 MG/DL (ref 0.6–1.2)
CULTURE, BLOOD 2: NORMAL
D DIMER: 381 NG/ML DDU (ref 0–229)
EKG ATRIAL RATE: 441 BPM
EKG DIAGNOSIS: NORMAL
EKG Q-T INTERVAL: 506 MS
EKG QRS DURATION: 90 MS
EKG QTC CALCULATION (BAZETT): 484 MS
EKG R AXIS: -29 DEGREES
EKG T AXIS: 3 DEGREES
EKG VENTRICULAR RATE: 55 BPM
EOSINOPHILS ABSOLUTE: 0 K/UL (ref 0–0.6)
EOSINOPHILS RELATIVE PERCENT: 0 %
EOSINOPHILS RELATIVE PERCENT: 0 %
EOSINOPHILS RELATIVE PERCENT: 0.1 %
GFR AFRICAN AMERICAN: >60
GFR NON-AFRICAN AMERICAN: >60
GLUCOSE BLD-MCNC: 123 MG/DL (ref 70–99)
GLUCOSE BLD-MCNC: 151 MG/DL (ref 70–99)
GLUCOSE BLD-MCNC: 156 MG/DL (ref 70–99)
GLUCOSE URINE: NEGATIVE MG/DL
HCT VFR BLD CALC: 33 % (ref 36–48)
HCT VFR BLD CALC: 33.8 % (ref 36–48)
HCT VFR BLD CALC: 34.6 % (ref 36–48)
HDLC SERPL-MCNC: 41 MG/DL (ref 40–60)
HEMOGLOBIN: 10.8 G/DL (ref 12–16)
HEMOGLOBIN: 11.1 G/DL (ref 12–16)
HEMOGLOBIN: 11.3 G/DL (ref 12–16)
IMMATURE RETIC FRACT: 0.3 (ref 0.21–0.37)
INR BLD: 1.22 (ref 0.88–1.12)
KETONES, URINE: NEGATIVE MG/DL
LACTIC ACID, SEPSIS: 1.1 MMOL/L (ref 0.4–1.9)
LACTIC ACID: 1.4 MMOL/L (ref 0.4–2)
LDL CHOLESTEROL CALCULATED: 122 MG/DL
LEUKOCYTE ESTERASE, URINE: NEGATIVE
LYMPHOCYTES ABSOLUTE: 0.3 K/UL (ref 1–5.1)
LYMPHOCYTES ABSOLUTE: 0.3 K/UL (ref 1–5.1)
LYMPHOCYTES ABSOLUTE: 0.4 K/UL (ref 1–5.1)
LYMPHOCYTES RELATIVE PERCENT: 3.8 %
LYMPHOCYTES RELATIVE PERCENT: 3.9 %
LYMPHOCYTES RELATIVE PERCENT: 5 %
MAGNESIUM: 2.1 MG/DL (ref 1.8–2.4)
MAGNESIUM: 2.2 MG/DL (ref 1.8–2.4)
MCH RBC QN AUTO: 28.5 PG (ref 26–34)
MCH RBC QN AUTO: 28.7 PG (ref 26–34)
MCH RBC QN AUTO: 28.8 PG (ref 26–34)
MCHC RBC AUTO-ENTMCNC: 32.6 G/DL (ref 31–36)
MCHC RBC AUTO-ENTMCNC: 32.7 G/DL (ref 31–36)
MCHC RBC AUTO-ENTMCNC: 32.8 G/DL (ref 31–36)
MCV RBC AUTO: 87.2 FL (ref 80–100)
MCV RBC AUTO: 88 FL (ref 80–100)
MCV RBC AUTO: 88.3 FL (ref 80–100)
MICROSCOPIC EXAMINATION: YES
MONOCYTES ABSOLUTE: 0.4 K/UL (ref 0–1.3)
MONOCYTES RELATIVE PERCENT: 4.7 %
MONOCYTES RELATIVE PERCENT: 5 %
MONOCYTES RELATIVE PERCENT: 5.8 %
NEUTROPHILS ABSOLUTE: 6.7 K/UL (ref 1.7–7.7)
NEUTROPHILS ABSOLUTE: 6.9 K/UL (ref 1.7–7.7)
NEUTROPHILS ABSOLUTE: 8 K/UL (ref 1.7–7.7)
NEUTROPHILS RELATIVE PERCENT: 89.9 %
NEUTROPHILS RELATIVE PERCENT: 90.3 %
NEUTROPHILS RELATIVE PERCENT: 91.3 %
NITRITE, URINE: NEGATIVE
PDW BLD-RTO: 14.6 % (ref 12.4–15.4)
PDW BLD-RTO: 14.8 % (ref 12.4–15.4)
PDW BLD-RTO: 14.9 % (ref 12.4–15.4)
PH UA: 5.5 (ref 5–8)
PLATELET # BLD: 182 K/UL (ref 135–450)
PLATELET # BLD: 182 K/UL (ref 135–450)
PLATELET # BLD: 225 K/UL (ref 135–450)
PMV BLD AUTO: 7.7 FL (ref 5–10.5)
PMV BLD AUTO: 7.8 FL (ref 5–10.5)
PMV BLD AUTO: 8.1 FL (ref 5–10.5)
POTASSIUM REFLEX MAGNESIUM: 2.8 MMOL/L (ref 3.5–5.1)
POTASSIUM REFLEX MAGNESIUM: 3.6 MMOL/L (ref 3.5–5.1)
POTASSIUM SERPL-SCNC: 3.7 MMOL/L (ref 3.5–5.1)
POTASSIUM SERPL-SCNC: 4.2 MMOL/L (ref 3.5–5.1)
PRO-BNP: 348 PG/ML (ref 0–449)
PROCALCITONIN: 0.15 NG/ML (ref 0–0.15)
PROTEIN UA: 30 MG/DL
PROTHROMBIN TIME: 13.9 SEC (ref 9.9–12.7)
RBC # BLD: 3.75 M/UL (ref 4–5.2)
RBC # BLD: 3.87 M/UL (ref 4–5.2)
RBC # BLD: 3.92 M/UL (ref 4–5.2)
RBC UA: ABNORMAL /HPF (ref 0–4)
RETICULOCYTE ABSOLUTE COUNT: 0.03 M/UL (ref 0.02–0.1)
RETICULOCYTE COUNT PCT: 0.83 % (ref 0.5–2.18)
SARS-COV-2, NAAT: DETECTED
SODIUM BLD-SCNC: 140 MMOL/L (ref 136–145)
SODIUM BLD-SCNC: 141 MMOL/L (ref 136–145)
SODIUM BLD-SCNC: 146 MMOL/L (ref 136–145)
SPECIFIC GRAVITY UA: >=1.03 (ref 1–1.03)
TOTAL PROTEIN: 6.1 G/DL (ref 6.4–8.2)
TOTAL PROTEIN: 6.3 G/DL (ref 6.4–8.2)
TOTAL PROTEIN: 6.4 G/DL (ref 6.4–8.2)
TRIGL SERPL-MCNC: 165 MG/DL (ref 0–150)
TROPONIN: <0.01 NG/ML
TROPONIN: <0.01 NG/ML
URINE TYPE: ABNORMAL
UROBILINOGEN, URINE: 1 E.U./DL
VLDLC SERPL CALC-MCNC: 33 MG/DL
WBC # BLD: 7.5 K/UL (ref 4–11)
WBC # BLD: 7.5 K/UL (ref 4–11)
WBC # BLD: 8.9 K/UL (ref 4–11)
WBC UA: ABNORMAL /HPF (ref 0–5)

## 2022-01-01 PROCEDURE — 93010 ELECTROCARDIOGRAM REPORT: CPT | Performed by: INTERNAL MEDICINE

## 2022-01-01 PROCEDURE — 87040 BLOOD CULTURE FOR BACTERIA: CPT

## 2022-01-01 PROCEDURE — 80061 LIPID PANEL: CPT

## 2022-01-01 PROCEDURE — 93005 ELECTROCARDIOGRAM TRACING: CPT | Performed by: NURSE PRACTITIONER

## 2022-01-01 PROCEDURE — 6360000002 HC RX W HCPCS: Performed by: NURSE PRACTITIONER

## 2022-01-01 PROCEDURE — 94761 N-INVAS EAR/PLS OXIMETRY MLT: CPT

## 2022-01-01 PROCEDURE — 1200000000 HC SEMI PRIVATE

## 2022-01-01 PROCEDURE — 71045 X-RAY EXAM CHEST 1 VIEW: CPT

## 2022-01-01 PROCEDURE — 36415 COLL VENOUS BLD VENIPUNCTURE: CPT

## 2022-01-01 PROCEDURE — 84132 ASSAY OF SERUM POTASSIUM: CPT

## 2022-01-01 PROCEDURE — 85025 COMPLETE CBC W/AUTO DIFF WBC: CPT

## 2022-01-01 PROCEDURE — 83735 ASSAY OF MAGNESIUM: CPT

## 2022-01-01 PROCEDURE — 80053 COMPREHEN METABOLIC PANEL: CPT

## 2022-01-01 PROCEDURE — 86140 C-REACTIVE PROTEIN: CPT

## 2022-01-01 PROCEDURE — 83880 ASSAY OF NATRIURETIC PEPTIDE: CPT

## 2022-01-01 PROCEDURE — 97530 THERAPEUTIC ACTIVITIES: CPT

## 2022-01-01 PROCEDURE — 99285 EMERGENCY DEPT VISIT HI MDM: CPT

## 2022-01-01 PROCEDURE — 1036F TOBACCO NON-USER: CPT | Performed by: PHYSICIAN ASSISTANT

## 2022-01-01 PROCEDURE — 83605 ASSAY OF LACTIC ACID: CPT

## 2022-01-01 PROCEDURE — 84484 ASSAY OF TROPONIN QUANT: CPT

## 2022-01-01 PROCEDURE — 6370000000 HC RX 637 (ALT 250 FOR IP): Performed by: STUDENT IN AN ORGANIZED HEALTH CARE EDUCATION/TRAINING PROGRAM

## 2022-01-01 PROCEDURE — 2580000003 HC RX 258: Performed by: NURSE PRACTITIONER

## 2022-01-01 PROCEDURE — G8427 DOCREV CUR MEDS BY ELIG CLIN: HCPCS | Performed by: PHYSICIAN ASSISTANT

## 2022-01-01 PROCEDURE — 2700000000 HC OXYGEN THERAPY PER DAY

## 2022-01-01 PROCEDURE — 6370000000 HC RX 637 (ALT 250 FOR IP): Performed by: NURSE PRACTITIONER

## 2022-01-01 PROCEDURE — 4040F PNEUMOC VAC/ADMIN/RCVD: CPT | Performed by: PHYSICIAN ASSISTANT

## 2022-01-01 PROCEDURE — 97535 SELF CARE MNGMENT TRAINING: CPT

## 2022-01-01 PROCEDURE — 6360000002 HC RX W HCPCS: Performed by: INTERNAL MEDICINE

## 2022-01-01 PROCEDURE — 81001 URINALYSIS AUTO W/SCOPE: CPT

## 2022-01-01 PROCEDURE — 1090F PRES/ABSN URINE INCON ASSESS: CPT | Performed by: PHYSICIAN ASSISTANT

## 2022-01-01 PROCEDURE — 99214 OFFICE O/P EST MOD 30 MIN: CPT | Performed by: PHYSICIAN ASSISTANT

## 2022-01-01 PROCEDURE — G8417 CALC BMI ABV UP PARAM F/U: HCPCS | Performed by: PHYSICIAN ASSISTANT

## 2022-01-01 PROCEDURE — 97162 PT EVAL MOD COMPLEX 30 MIN: CPT

## 2022-01-01 PROCEDURE — 85730 THROMBOPLASTIN TIME PARTIAL: CPT

## 2022-01-01 PROCEDURE — 85045 AUTOMATED RETICULOCYTE COUNT: CPT

## 2022-01-01 PROCEDURE — 93971 EXTREMITY STUDY: CPT

## 2022-01-01 PROCEDURE — 6360000002 HC RX W HCPCS: Performed by: STUDENT IN AN ORGANIZED HEALTH CARE EDUCATION/TRAINING PROGRAM

## 2022-01-01 PROCEDURE — 2500000003 HC RX 250 WO HCPCS: Performed by: NURSE PRACTITIONER

## 2022-01-01 PROCEDURE — 84145 PROCALCITONIN (PCT): CPT

## 2022-01-01 PROCEDURE — 87635 SARS-COV-2 COVID-19 AMP PRB: CPT

## 2022-01-01 PROCEDURE — 1123F ACP DISCUSS/DSCN MKR DOCD: CPT | Performed by: PHYSICIAN ASSISTANT

## 2022-01-01 PROCEDURE — G8484 FLU IMMUNIZE NO ADMIN: HCPCS | Performed by: PHYSICIAN ASSISTANT

## 2022-01-01 PROCEDURE — 2580000003 HC RX 258: Performed by: STUDENT IN AN ORGANIZED HEALTH CARE EDUCATION/TRAINING PROGRAM

## 2022-01-01 PROCEDURE — 85379 FIBRIN DEGRADATION QUANT: CPT

## 2022-01-01 PROCEDURE — 97166 OT EVAL MOD COMPLEX 45 MIN: CPT

## 2022-01-01 PROCEDURE — 85610 PROTHROMBIN TIME: CPT

## 2022-01-01 RX ORDER — ONDANSETRON 4 MG/1
4 TABLET, ORALLY DISINTEGRATING ORAL EVERY 8 HOURS PRN
Status: DISCONTINUED | OUTPATIENT
Start: 2022-01-01 | End: 2022-01-01 | Stop reason: HOSPADM

## 2022-01-01 RX ORDER — AMLODIPINE BESYLATE 5 MG/1
5 TABLET ORAL DAILY
Qty: 30 TABLET | Refills: 0
Start: 2022-01-01 | End: 2022-03-17

## 2022-01-01 RX ORDER — POTASSIUM CHLORIDE 7.45 MG/ML
10 INJECTION INTRAVENOUS PRN
Status: DISCONTINUED | OUTPATIENT
Start: 2022-01-01 | End: 2022-01-01

## 2022-01-01 RX ORDER — GUAIFENESIN/DEXTROMETHORPHAN 100-10MG/5
5 SYRUP ORAL EVERY 4 HOURS PRN
Status: DISCONTINUED | OUTPATIENT
Start: 2022-01-01 | End: 2022-01-01 | Stop reason: HOSPADM

## 2022-01-01 RX ORDER — ACETAMINOPHEN 325 MG/1
650 TABLET ORAL EVERY 6 HOURS PRN
Status: DISCONTINUED | OUTPATIENT
Start: 2022-01-01 | End: 2022-01-01 | Stop reason: HOSPADM

## 2022-01-01 RX ORDER — ACETAMINOPHEN 650 MG/1
650 SUPPOSITORY RECTAL EVERY 6 HOURS PRN
Status: DISCONTINUED | OUTPATIENT
Start: 2022-01-01 | End: 2022-01-01 | Stop reason: HOSPADM

## 2022-01-01 RX ORDER — SODIUM CHLORIDE, SODIUM LACTATE, POTASSIUM CHLORIDE, CALCIUM CHLORIDE 600; 310; 30; 20 MG/100ML; MG/100ML; MG/100ML; MG/100ML
INJECTION, SOLUTION INTRAVENOUS CONTINUOUS
Status: ACTIVE | OUTPATIENT
Start: 2022-01-01 | End: 2022-01-01

## 2022-01-01 RX ORDER — DONEPEZIL HYDROCHLORIDE 5 MG/1
10 TABLET, FILM COATED ORAL NIGHTLY
Status: DISCONTINUED | OUTPATIENT
Start: 2022-01-01 | End: 2022-01-01 | Stop reason: HOSPADM

## 2022-01-01 RX ORDER — METOPROLOL TARTRATE 5 MG/5ML
5 INJECTION INTRAVENOUS EVERY 6 HOURS PRN
Status: DISCONTINUED | OUTPATIENT
Start: 2022-01-01 | End: 2022-01-01

## 2022-01-01 RX ORDER — MECOBALAMIN 5000 MCG
5 TABLET,DISINTEGRATING ORAL NIGHTLY
Status: DISCONTINUED | OUTPATIENT
Start: 2022-01-01 | End: 2022-01-01 | Stop reason: HOSPADM

## 2022-01-01 RX ORDER — POLYETHYLENE GLYCOL 3350 17 G/17G
17 POWDER, FOR SOLUTION ORAL DAILY PRN
Status: DISCONTINUED | OUTPATIENT
Start: 2022-01-01 | End: 2022-01-01 | Stop reason: HOSPADM

## 2022-01-01 RX ORDER — LORAZEPAM 2 MG/ML
1 INJECTION INTRAMUSCULAR ONCE
Status: COMPLETED | OUTPATIENT
Start: 2022-01-01 | End: 2022-01-01

## 2022-01-01 RX ORDER — DEXAMETHASONE 4 MG/1
6 TABLET ORAL DAILY
Status: DISCONTINUED | OUTPATIENT
Start: 2022-01-01 | End: 2022-01-01 | Stop reason: HOSPADM

## 2022-01-01 RX ORDER — POLYETHYLENE GLYCOL 3350 17 G/17G
17 POWDER, FOR SOLUTION ORAL 2 TIMES DAILY
Status: DISCONTINUED | OUTPATIENT
Start: 2022-01-01 | End: 2022-01-01 | Stop reason: HOSPADM

## 2022-01-01 RX ORDER — ONDANSETRON 2 MG/ML
4 INJECTION INTRAMUSCULAR; INTRAVENOUS EVERY 6 HOURS PRN
Status: DISCONTINUED | OUTPATIENT
Start: 2022-01-01 | End: 2022-01-01 | Stop reason: HOSPADM

## 2022-01-01 RX ORDER — AMLODIPINE BESYLATE 5 MG/1
5 TABLET ORAL DAILY
Status: DISCONTINUED | OUTPATIENT
Start: 2022-01-01 | End: 2022-01-01 | Stop reason: HOSPADM

## 2022-01-01 RX ORDER — LABETALOL HYDROCHLORIDE 5 MG/ML
5 INJECTION, SOLUTION INTRAVENOUS EVERY 6 HOURS PRN
Status: DISCONTINUED | OUTPATIENT
Start: 2022-01-01 | End: 2022-01-01 | Stop reason: HOSPADM

## 2022-01-01 RX ORDER — SODIUM CHLORIDE 9 MG/ML
25 INJECTION, SOLUTION INTRAVENOUS PRN
Status: DISCONTINUED | OUTPATIENT
Start: 2022-01-01 | End: 2022-01-01 | Stop reason: HOSPADM

## 2022-01-01 RX ORDER — FUROSEMIDE 20 MG/1
20 TABLET ORAL DAILY
Status: DISCONTINUED | OUTPATIENT
Start: 2022-01-01 | End: 2022-01-01 | Stop reason: HOSPADM

## 2022-01-01 RX ORDER — DEXAMETHASONE 6 MG/1
6 TABLET ORAL DAILY
Qty: 7 TABLET | Refills: 0
Start: 2022-01-01 | End: 2022-02-22

## 2022-01-01 RX ORDER — POLYMYXIN B SULFATE AND TRIMETHOPRIM 1; 10000 MG/ML; [USP'U]/ML
1 SOLUTION OPHTHALMIC EVERY 4 HOURS
Qty: 20 ML | Refills: 0 | Status: SHIPPED | OUTPATIENT
Start: 2022-01-01 | End: 2022-01-01

## 2022-01-01 RX ORDER — SODIUM CHLORIDE 0.9 % (FLUSH) 0.9 %
5-40 SYRINGE (ML) INJECTION PRN
Status: DISCONTINUED | OUTPATIENT
Start: 2022-01-01 | End: 2022-01-01 | Stop reason: HOSPADM

## 2022-01-01 RX ORDER — SODIUM CHLORIDE 9 MG/ML
INJECTION, SOLUTION INTRAVENOUS CONTINUOUS
Status: DISCONTINUED | OUTPATIENT
Start: 2022-01-01 | End: 2022-01-01

## 2022-01-01 RX ORDER — SODIUM CHLORIDE 0.9 % (FLUSH) 0.9 %
5-40 SYRINGE (ML) INJECTION EVERY 12 HOURS SCHEDULED
Status: DISCONTINUED | OUTPATIENT
Start: 2022-01-01 | End: 2022-01-01 | Stop reason: HOSPADM

## 2022-01-01 RX ORDER — TRAZODONE HYDROCHLORIDE 50 MG/1
150 TABLET ORAL NIGHTLY
Status: DISCONTINUED | OUTPATIENT
Start: 2022-01-01 | End: 2022-01-01 | Stop reason: HOSPADM

## 2022-01-01 RX ADMIN — DONEPEZIL HYDROCHLORIDE 10 MG: 5 TABLET, FILM COATED ORAL at 22:02

## 2022-01-01 RX ADMIN — SODIUM CHLORIDE, PRESERVATIVE FREE 10 ML: 5 INJECTION INTRAVENOUS at 08:23

## 2022-01-01 RX ADMIN — TRAZODONE HYDROCHLORIDE 150 MG: 50 TABLET ORAL at 22:02

## 2022-01-01 RX ADMIN — POTASSIUM CHLORIDE 10 MEQ: 7.46 INJECTION, SOLUTION INTRAVENOUS at 16:01

## 2022-01-01 RX ADMIN — DONEPEZIL HYDROCHLORIDE 10 MG: 5 TABLET, FILM COATED ORAL at 23:00

## 2022-01-01 RX ADMIN — SODIUM CHLORIDE, PRESERVATIVE FREE 10 ML: 5 INJECTION INTRAVENOUS at 23:02

## 2022-01-01 RX ADMIN — TRAZODONE HYDROCHLORIDE 150 MG: 50 TABLET ORAL at 23:00

## 2022-01-01 RX ADMIN — ENOXAPARIN SODIUM 30 MG: 30 INJECTION SUBCUTANEOUS at 08:50

## 2022-01-01 RX ADMIN — POTASSIUM CHLORIDE 10 MEQ: 7.46 INJECTION, SOLUTION INTRAVENOUS at 11:20

## 2022-01-01 RX ADMIN — SODIUM CHLORIDE, POTASSIUM CHLORIDE, SODIUM LACTATE AND CALCIUM CHLORIDE: 600; 310; 30; 20 INJECTION, SOLUTION INTRAVENOUS at 08:54

## 2022-01-01 RX ADMIN — POLYETHYLENE GLYCOL 3350 17 G: 17 POWDER, FOR SOLUTION ORAL at 08:23

## 2022-01-01 RX ADMIN — ENOXAPARIN SODIUM 30 MG: 30 INJECTION SUBCUTANEOUS at 23:00

## 2022-01-01 RX ADMIN — AMLODIPINE BESYLATE 5 MG: 5 TABLET ORAL at 14:23

## 2022-01-01 RX ADMIN — ENOXAPARIN SODIUM 30 MG: 30 INJECTION SUBCUTANEOUS at 09:09

## 2022-01-01 RX ADMIN — TRAZODONE HYDROCHLORIDE 150 MG: 50 TABLET ORAL at 22:23

## 2022-01-01 RX ADMIN — POLYETHYLENE GLYCOL 3350 17 G: 17 POWDER, FOR SOLUTION ORAL at 10:06

## 2022-01-01 RX ADMIN — ENOXAPARIN SODIUM 30 MG: 30 INJECTION SUBCUTANEOUS at 22:24

## 2022-01-01 RX ADMIN — LORAZEPAM 1 MG: 2 INJECTION INTRAMUSCULAR; INTRAVENOUS at 14:09

## 2022-01-01 RX ADMIN — POLYETHYLENE GLYCOL 3350 17 G: 17 POWDER, FOR SOLUTION ORAL at 09:17

## 2022-01-01 RX ADMIN — ENOXAPARIN SODIUM 30 MG: 30 INJECTION SUBCUTANEOUS at 22:26

## 2022-01-01 RX ADMIN — SODIUM CHLORIDE: 9 INJECTION, SOLUTION INTRAVENOUS at 22:24

## 2022-01-01 RX ADMIN — POTASSIUM CHLORIDE 10 MEQ: 7.46 INJECTION, SOLUTION INTRAVENOUS at 14:41

## 2022-01-01 RX ADMIN — POLYETHYLENE GLYCOL 3350 17 G: 17 POWDER, FOR SOLUTION ORAL at 22:04

## 2022-01-01 RX ADMIN — DONEPEZIL HYDROCHLORIDE 10 MG: 5 TABLET, FILM COATED ORAL at 22:23

## 2022-01-01 RX ADMIN — SODIUM CHLORIDE, PRESERVATIVE FREE 10 ML: 5 INJECTION INTRAVENOUS at 08:50

## 2022-01-01 RX ADMIN — Medication 5 MG: at 23:00

## 2022-01-01 RX ADMIN — METOPROLOL TARTRATE 5 MG: 5 INJECTION INTRAVENOUS at 22:23

## 2022-01-01 RX ADMIN — FUROSEMIDE 20 MG: 20 TABLET ORAL at 10:06

## 2022-01-01 RX ADMIN — POTASSIUM CHLORIDE 10 MEQ: 7.46 INJECTION, SOLUTION INTRAVENOUS at 10:19

## 2022-01-01 RX ADMIN — SERTRALINE 50 MG: 50 TABLET, FILM COATED ORAL at 10:06

## 2022-01-01 RX ADMIN — ENOXAPARIN SODIUM 30 MG: 30 INJECTION SUBCUTANEOUS at 10:09

## 2022-01-01 RX ADMIN — Medication 5 MG: at 22:02

## 2022-01-01 RX ADMIN — SODIUM CHLORIDE, PRESERVATIVE FREE 10 ML: 5 INJECTION INTRAVENOUS at 20:15

## 2022-01-01 RX ADMIN — SERTRALINE 50 MG: 50 TABLET, FILM COATED ORAL at 09:09

## 2022-01-01 RX ADMIN — POTASSIUM CHLORIDE 10 MEQ: 7.46 INJECTION, SOLUTION INTRAVENOUS at 18:09

## 2022-01-01 RX ADMIN — ENOXAPARIN SODIUM 30 MG: 30 INJECTION SUBCUTANEOUS at 08:24

## 2022-01-01 RX ADMIN — FUROSEMIDE 20 MG: 20 TABLET ORAL at 09:09

## 2022-01-01 RX ADMIN — ENOXAPARIN SODIUM 30 MG: 30 INJECTION SUBCUTANEOUS at 22:02

## 2022-01-01 RX ADMIN — POTASSIUM CHLORIDE 10 MEQ: 7.46 INJECTION, SOLUTION INTRAVENOUS at 12:58

## 2022-01-01 RX ADMIN — POLYETHYLENE GLYCOL 3350 17 G: 17 POWDER, FOR SOLUTION ORAL at 23:00

## 2022-01-01 SDOH — ECONOMIC STABILITY: HOUSING INSECURITY: IN THE LAST 12 MONTHS, HOW MANY PLACES HAVE YOU LIVED?: 1

## 2022-01-01 SDOH — ECONOMIC STABILITY: HOUSING INSECURITY
IN THE LAST 12 MONTHS, WAS THERE A TIME WHEN YOU DID NOT HAVE A STEADY PLACE TO SLEEP OR SLEPT IN A SHELTER (INCLUDING NOW)?: NO

## 2022-01-01 SDOH — ECONOMIC STABILITY: INCOME INSECURITY: IN THE LAST 12 MONTHS, WAS THERE A TIME WHEN YOU WERE NOT ABLE TO PAY THE MORTGAGE OR RENT ON TIME?: NO

## 2022-01-01 SDOH — ECONOMIC STABILITY: FOOD INSECURITY: WITHIN THE PAST 12 MONTHS, YOU WORRIED THAT YOUR FOOD WOULD RUN OUT BEFORE YOU GOT MONEY TO BUY MORE.: NEVER TRUE

## 2022-01-01 SDOH — ECONOMIC STABILITY: TRANSPORTATION INSECURITY
IN THE PAST 12 MONTHS, HAS LACK OF TRANSPORTATION KEPT YOU FROM MEETINGS, WORK, OR FROM GETTING THINGS NEEDED FOR DAILY LIVING?: NO

## 2022-01-01 SDOH — ECONOMIC STABILITY: FOOD INSECURITY: WITHIN THE PAST 12 MONTHS, THE FOOD YOU BOUGHT JUST DIDN'T LAST AND YOU DIDN'T HAVE MONEY TO GET MORE.: NEVER TRUE

## 2022-01-01 ASSESSMENT — PAIN SCALES - PAIN ASSESSMENT IN ADVANCED DEMENTIA (PAINAD)
FACIALEXPRESSION: 0
BODYLANGUAGE: 0
BREATHING: 0
NEGVOCALIZATION: 0
BREATHING: 0
CONSOLABILITY: 0
NEGVOCALIZATION: 0
BREATHING: 0
NEGVOCALIZATION: 0
FACIALEXPRESSION: 0
BODYLANGUAGE: 0
CONSOLABILITY: 0
CONSOLABILITY: 0
TOTALSCORE: 0
FACIALEXPRESSION: 0
FACIALEXPRESSION: 0
TOTALSCORE: 0
BREATHING: 0
TOTALSCORE: 0
BODYLANGUAGE: 0
CONSOLABILITY: 0
NEGVOCALIZATION: 0
NEGVOCALIZATION: 0
TOTALSCORE: 0
BODYLANGUAGE: 0
CONSOLABILITY: 0
CONSOLABILITY: 0
BREATHING: 0
TOTALSCORE: 0
TOTALSCORE: 0
FACIALEXPRESSION: 0
CONSOLABILITY: 0
CONSOLABILITY: 0
FACIALEXPRESSION: 0
BREATHING: 0
TOTALSCORE: 0
BREATHING: 0
NEGVOCALIZATION: 0
NEGVOCALIZATION: 0
BODYLANGUAGE: 0
BREATHING: 0
FACIALEXPRESSION: 0
FACIALEXPRESSION: 0
TOTALSCORE: 0
FACIALEXPRESSION: 0
CONSOLABILITY: 0
FACIALEXPRESSION: 0
TOTALSCORE: 0
BODYLANGUAGE: 0
BODYLANGUAGE: 0
NEGVOCALIZATION: 0
FACIALEXPRESSION: 0
BREATHING: 0
BODYLANGUAGE: 0
NEGVOCALIZATION: 0
BREATHING: 0
BODYLANGUAGE: 0
NEGVOCALIZATION: 0
NEGVOCALIZATION: 0
FACIALEXPRESSION: 0
BREATHING: 0
NEGVOCALIZATION: 0
CONSOLABILITY: 0
NEGVOCALIZATION: 0
BODYLANGUAGE: 0
BODYLANGUAGE: 0
TOTALSCORE: 0
BODYLANGUAGE: 0
BREATHING: 0
CONSOLABILITY: 0
TOTALSCORE: 0
TOTALSCORE: 0
BODYLANGUAGE: 0
FACIALEXPRESSION: 0
BREATHING: 0
CONSOLABILITY: 0

## 2022-01-01 ASSESSMENT — PAIN SCALES - GENERAL
PAINLEVEL_OUTOF10: 0

## 2022-01-01 ASSESSMENT — ENCOUNTER SYMPTOMS
SORE THROAT: 0
RHINORRHEA: 0
NAUSEA: 1
COUGH: 1
ABDOMINAL PAIN: 0
SHORTNESS OF BREATH: 0
DIARRHEA: 1
VOMITING: 1
CONSTIPATION: 0

## 2022-01-01 ASSESSMENT — SOCIAL DETERMINANTS OF HEALTH (SDOH): HOW HARD IS IT FOR YOU TO PAY FOR THE VERY BASICS LIKE FOOD, HOUSING, MEDICAL CARE, AND HEATING?: SOMEWHAT HARD

## 2022-01-01 ASSESSMENT — PAIN SCALES - WONG BAKER

## 2022-01-10 NOTE — TELEPHONE ENCOUNTER
From: Hieu Montemayor  To: Zackary Tellez  Sent: 1/10/2022 1:10 PM EST  Subject: Manda Brown. Eye    Is it possible to get her some antibodies. She does some cough mostly clear and it is getting better, She has no fever , no other symptoms . Her eyes just started last night around 2am i gave some mucinex nothing in eyes this morning - been putting warm rag on her eyes as well, just red, she is good spirit.  Thanks

## 2022-02-04 NOTE — TELEPHONE ENCOUNTER
She wouldn't be able to do the cognitive assessment, but we could do the rest of it. We can change her next OV to AWV.

## 2022-02-04 NOTE — TELEPHONE ENCOUNTER
I spoke with Maribell Tse and he stated that he does not want to set Yissel up for this right now. He stated that he will call back around April to get her scheduled.

## 2022-02-04 NOTE — TELEPHONE ENCOUNTER
----- Message from Cassie Matt sent at 2/4/2022 11:37 AM EST -----  Subject: Message to Provider    QUESTIONS  Information for Provider? Donato Emanuel is returning call about scheduling AWV. Atr Hoffman told them the last time they were in that she didn't think she would   be able to do an AWV. So he did not schedule one. If she does think it is   necessary please call Jem sai. ---------------------------------------------------------------------------  --------------  Heike Caceres INFO  What is the best way for the office to contact you? OK to leave message on   voicemail  Preferred Call Back Phone Number?  [phone]  ---------------------------------------------------------------------------  --------------  SCRIPT ANSWERS  undefined

## 2022-02-10 PROBLEM — R73.09 ELEVATED RANDOM BLOOD GLUCOSE LEVEL: Status: ACTIVE | Noted: 2022-01-01

## 2022-02-10 PROBLEM — R53.1 ASTHENIA: Status: ACTIVE | Noted: 2022-01-01

## 2022-02-10 PROBLEM — U07.1 COVID-19 VIREMIA: Status: ACTIVE | Noted: 2022-01-01

## 2022-02-10 PROBLEM — I10 HYPERTENSION, UNCONTROLLED: Status: ACTIVE | Noted: 2022-01-01

## 2022-02-10 NOTE — PROGRESS NOTES
Suly Head (:  1935) is a Established patient, here for evaluation of the following:    Assessment & Plan   Below is the assessment and plan developed based on review of pertinent history, physical exam, labs, studies, and medications. 1. Altered mental status, unspecified altered mental status type  2. Dementia with behavioral disturbance, unspecified dementia type ([address]  3. Cough  4. Non-intractable vomiting with nausea, unspecified vomiting type  - Jem informed that patient would need to be evaluated for AMS and acute nausea/vomitng and cough. At minimum needs blood work and covid test. Gilberto Lucero is unable to bring her in as she cannot ambulate and he cannot get her into the car. It sounds like her dementia has been gradually worsening and she has had an acute illness that has caused AMS. Needs evaluation and likely snf placement. As patient is unable to come into the office and he is unable to meet care needs at home, he was instructed to take patient to ED. Will go via EMS as he is unable to transport patient. Return if symptoms worsen or fail to improve. Subjective   HPI   Patient and her medical power of  requested VV concerning multiple acute symptoms. Gilberto Lucero reports that he is unable to bring her in to the office for evaluation. Unable to assist her to the bathroom and could not get her in the car. This is a new problem. Has had worsening nausea with vomiting over the last couple of weeks. No consistent po intake. No way of taking vitals. Also reports increased incontinence- both bowel and bladder. Patient is unable to verbalize if she is having dysuria or abdominal pain. Gilberto Lucero denies fever. Does endorse cough, sometimes productive. Patient has history of dementia. She is on xanax prn but Gilberto Lucero reports acute changes in mental status over the last few days. Uncooperative and at times combative. He is afraid she is going to fall.        Review of Systems Constitutional: Positive for activity change, appetite change and fatigue. Negative for chills and fever. HENT: Negative for congestion, ear pain, rhinorrhea and sore throat. Eyes: Negative for visual disturbance. Respiratory: Positive for cough. Negative for shortness of breath. Cardiovascular: Negative for chest pain and palpitations. Gastrointestinal: Positive for diarrhea, nausea and vomiting. Negative for abdominal pain and constipation. Genitourinary: Negative for difficulty urinating and dysuria. Musculoskeletal: Negative for arthralgias and myalgias. Skin: Negative for rash. Neurological: Negative for dizziness, weakness and numbness. +AMS   Psychiatric/Behavioral: Positive for agitation, behavioral problems, confusion and sleep disturbance. The patient is nervous/anxious.            Objective   Patient-Reported Vitals  No data recorded     Physical Exam  [INSTRUCTIONS:  \"[x]\" Indicates a positive item  \"[]\" Indicates a negative item  -- DELETE ALL ITEMS NOT EXAMINED]    Constitutional: [] Appears well-developed and well-nourished [x] No apparent distress      [x] Abnormal - Ill appearing    Mental status: [x] Alert and awake  [] Oriented to person/place/time [] Able to follow commands    [x] Abnormal - unable to follow 1  Step commands (new)    Eyes:   EOM    [x]  Normal    [] Abnormal -   Sclera  [x]  Normal    [] Abnormal -          Discharge [x]  None visible   [] Abnormal -     HENT: [x] Normocephalic, atraumatic  [] Abnormal -   [x] Mouth/Throat: Mucous membranes are moist    External Ears [x] Normal  [] Abnormal -    Neck: [x] No visualized mass [] Abnormal -     Pulmonary/Chest: [x] Respiratory effort normal   [x] No visualized signs of difficulty breathing or respiratory distress        [] Abnormal -      Musculoskeletal:   [x] Normal gait with no signs of ataxia         [x] Normal range of motion of neck        [] Abnormal -     Neurological:        [] No Facial Asymmetry (Cranial nerve 7 motor function) (limited exam due to video visit)          [] No gaze palsy        [] Abnormal -          Skin:        [] No significant exanthematous lesions or discoloration noted on facial skin         [x] Abnormal -  Abrasion to chin            Psychiatric:       [] Normal Affect [x] Abnormal - altered, agitated       [] No Hallucinations    Other pertinent observable physical exam findings:-                 Jayla Crew, was evaluated through a synchronous (real-time) audio-video encounter. The patient (or guardian if applicable) is aware that this is a billable service, which includes applicable co-pays. This Virtual Visit was conducted with patient's (and/or legal guardian's) consent. The visit was conducted pursuant to the emergency declaration under the [address] [place] [address] authority and the [place] [employer] [place] [employer] Act. Patient identification was verified, and a caregiver was present when appropriate. The patient was located at home in a state where the provider was licensed to provide care.        --LEVAR Francis

## 2022-02-10 NOTE — PROGRESS NOTES
Physical Therapy    Facility/Department: [address] [place] [address]  ED  Initial Assessment    NAME: Leeanne Srivastava  : 1935  MRN: [id number]    Date of Service: 2/10/2022    Discharge Recommendations:  Subacute/Skilled Nursing Facility   PT Equipment Recommendations  Equipment Needed: No  Other: defer  If pt is unable to be seen after this session, please let this note serve as discharge summary. Please see case management note for discharge disposition. Thank you. Assessment   Body structures, Functions, Activity limitations: Decreased functional mobility ; Decreased cognition;Decreased endurance;Decreased balance;Decreased ADL status  Assessment: Pt seen in the ED with reports of decreased mobility and AMS. Pt very confused, unable to answer orientation questions and limited in following commands. Pt's S.O. arrived mid session to provide social history that pt uses a cane for ambulation and requires assist for ADLs. PT attempted to facilitate bed mobility, pt would move her LE to EOB but then refuse to move further. Able to demonstrate heelslide with tactile cues. Pt unsafe to return home due. Recommend SNF to progress and maximize mobility. Treatment Diagnosis: decreased mobility  Prognosis: Good  Decision Making: Low Complexity  PT Education: Goals;Orientation;Disease Specific Education  Patient Education: no evidence of learning  Barriers to Learning: cognition  REQUIRES PT FOLLOW UP: Yes  Activity Tolerance  Activity Tolerance: Patient limited by cognitive status       Patient Diagnosis(es): There were no encounter diagnoses. has no past medical history on file. has no past surgical history on file.     Restrictions     Vision/Hearing  Vision: Within Functional Limits  Hearing: Exceptions to [facility]  Hearing Exceptions: Hard of hearing/hearing concerns     Subjective  General  Chart Reviewed: Yes  Patient assessed for rehabilitation services?: Yes  Response To Previous Treatment: Not applicable  Family / Caregiver Present: No  Referring Practitioner: SAUL Lopez CNP  Referral Date : 02/10/22  Follows Commands: Impaired  General Comment  Comments: cleared by nursing  Subjective  Subjective: pt resting in bed. Does not know why she is here. Denies pain  Pain Screening  Patient Currently in Pain: Other (comment)          Orientation  Orientation  Overall Orientation Status: Impaired  Orientation Level: Oriented to person;Disoriented to place; Disoriented to time;Disoriented to situation  Social/Functional History  Social/Functional History  Lives With: Significant other  Type of Home: Apartment  Home Layout: One level  Home Access: Stairs to enter with rails  Entrance Stairs - Number of Steps: 1 flight  Entrance Stairs - Rails: Both  Bathroom Shower/Tub: Tub/Shower unit  Bathroom Toilet: Standard  Home Equipment: Cane  ADL Assistance: Needs assistance  Bath: Supervision  Dressing: Independent  Grooming: Independent  Feeding: Unable to assess(comment)  Toileting: Independent  Homemaking Assistance: Needs assistance  Homemaking Responsibilities: No  Ambulation Assistance: Independent (used to use a cane until she began using it as a weapon)  Transfer Assistance: Independent  Cognition        Objective          PROM RLE (degrees)  RLE PROM: WFL  AROM RLE (degrees)  RLE AROM: WFL  PROM LLE (degrees)  LLE PROM: WFL  AROM LLE (degrees)  LLE AROM : WFL  Strength RLE  Comment: unable to assess. Grossly 2+/5  Strength LLE  Comment: unable to assess.  Grossly 2+/5  Tone RLE  RLE Tone: Normotonic  Tone LLE  LLE Tone: Normotonic  Motor Control  Gross Motor?: WFL     Bed mobility  Supine to Sit: Unable to assess  Transfers  Sit to Stand: Unable to assess  Stand to sit: Unable to assess  Ambulation  Ambulation?: No              Plan   Plan  Times per week: 3-5x/week  Current Treatment Recommendations: Lopez Frizzle Training,Functional Mobility Training,Transfer Training  Safety

## 2022-02-10 NOTE — PROGRESS NOTES
Occupational Therapy   Occupational Therapy Initial Assessment/Treatment  Date: 2/10/2022   Patient Name: Nik Fletcher  MRN: [id number]     : 1935    Date of Service: 2/10/2022    Discharge Recommendations:  Subacute/Skilled Nursing Facility       Assessment   Performance deficits / Impairments: Decreased functional mobility ; Decreased strength;Decreased endurance;Decreased ADL status; Decreased safe awareness;Decreased balance;Decreased cognition  Assessment: Pt seen in ED with AMS, pt not speaking to therapist most info provided by significant other (Darryl Rodarte) per his report over past month patient less mobil and requiring more assist with ADLs. Unable to assess OOB d/t patient confusion. After evaluation, pt found to be presenting with the above mentioned occupational performance deficits which are affecting participation in daily living skills. Pt would benefit from continued skilled occupational therapy to address ADLs, functional mobility, and safety while in acute care. Prognosis: Guarded  Decision Making: Medium Complexity  OT Education: OT Role;Plan of Care;Orientation; Family Education;Transfer Training  Patient Education: Disease specific: Given patient's history of falls, patient educated on fall risks and prevention techniques, including: While in the hospital, CALL before you FALL; bed/chair alarms; wearing non-skid socks/shoes; having all needed items within reach, including nurse call light. Patient verbalized understanding. Barriers to Learning: cognition  REQUIRES OT FOLLOW UP: Yes  Activity Tolerance  Activity Tolerance: Treatment limited secondary to decreased cognition  Activity Tolerance: Vitals: Bp not calculating as pt moving arm around and won't keep it still, O2 sats 92% HR 65. Safety Devices  Safety Devices in place: Yes  Type of devices: Call light within reach; Left in bed;Nurse notified           Patient Diagnosis(es): There were no encounter diagnoses.      has no past medical history on file. has no past surgical history on file. Restrictions   fall risk    Subjective   General  Chart Reviewed: Yes,Orders,Progress Notes,History and Physical,Imaging  Patient assessed for rehabilitation services?: Yes  Family / Caregiver Present: Yes Flip Pranellmicheline [facility]) boyfriend)  Referring Practitioner: SAUL Lopez CNP 2/10/22  Diagnosis: AMS  Subjective  Subjective: Pt confused, not following commands a bit resistive to therapy evaluation.  \"no comprenda\"  Patient Currently in Pain: Other (comment)  Pain Assessment  Pain Assessment: Advanced Dementia  Vital Signs  Pulse: 66  Heart Rate Source: Monitor  BP: (!) 157/83  MAP (mmHg): 102  Patient Position: Semi fowlers  Level of Consciousness: Alert (0)  Patient Currently in Pain: Other (comment)  Oxygen Therapy  SpO2: 92 %  Pulse Oximeter Device Mode: Continuous  O2 Device: None (Room air)  Social/Functional History  Social/Functional History  Lives With: Significant other  Type of Home: Apartment  Home Layout: One level  Home Access: Stairs to enter with rails  Entrance Stairs - Number of Steps: 1 flight  Entrance Stairs - Rails: Both  Bathroom Shower/Tub: Tub/Shower unit  Bathroom Toilet: Standard  Home Equipment: Cane  ADL Assistance: Needs assistance  Bath: Supervision  Dressing: Independent  Grooming: Independent  Feeding: Unable to assess(comment)  Toileting: Independent  Homemaking Assistance: Needs assistance  Homemaking Responsibilities: No  Ambulation Assistance: Independent (used to use a cane until she began using it as a weapon)  Transfer Assistance: Independent       Objective        Orientation  Overall Orientation Status: Impaired  Orientation Level: Disoriented X4 (able to recognize significant other \"Jem\" in room)     Balance  Sitting Balance: Unable to assess(comment) (pt resistive)  Standing Balance: Unable to assess(comment)  ADL  Grooming: Dependent/Total  UE Dressing: Maximum assistance  LE Dressing: Dependent/Total  Toileting: Dependent/Total     Tone RUE  RUE Tone: Normotonic  Tone LUE  LUE Tone: Normotonic  Coordination  Movements Are Fluid And Coordinated: Yes     Bed mobility  Supine to Sit: Unable to assess (safely d/t cognition)  Transfers  Transfer Comments: YANE pt resistive, not following commands     Cognition  Overall Cognitive Status: Exceptions  Arousal/Alertness: Inconsistent responses to stimuli  Following Commands: Does not follow commands  Attention Span: Unable to maintain attention  Memory: Decreased short term memory;Decreased long term memory;Decreased recall of precautions;Decreased recall of biographical Information;Decreased recall of recent events  Safety Judgement: Decreased awareness of need for safety;Decreased awareness of need for assistance  Problem Solving: Decreased awareness of errors  Insights: Not aware of deficits  Initiation: Requires cues for all  Sequencing: Requires cues for all        LUE AROM (degrees)  LUE AROM : WFL  RUE AROM (degrees)  RUE AROM : WFL  LUE Strength  Gross LUE Strength: WFL  RUE Strength  Gross RUE Strength: WFL         Plan   Plan  Times per week: 2-3x's a week while in acute care    AM-PAC Score        -[facility] Inpatient Daily Activity Raw Score: 8 (02/10/22 1634)  AM-PAC Inpatient ADL T-Scale Score : 22.86 (02/10/22 1634)  ADL Inpatient CMS 0-100% Score: 85.69 (02/10/22 1634)  ADL Inpatient CMS G-Code Modifier : CM (02/10/22 1634)    Goals  Short term goals  Time Frame for Short term goals: 1 week 1/17  Short term goal 1: Pt will complete LE dressing with mod  Short term goal 2: Pt will complete 10 reps of BUE AROM exercises to increase strength for ADLs/transfers by 1/15  Short term goal 3: Pt will complete EOB sitting 5 mins with Dick for balance  Short term goal 4: Anticipate mod A of 2 for functional transfers.   Patient Goals   Patient goals : YANE d/t cognition       Therapy Time   Individual Concurrent Group Co-treatment   Time In 8202 Time Out 1620         Minutes 10         Timed Code Treatment Minutes: 0 Minutes       Lazarus Somerset, OTR/L  If pt is unable to be seen after this session, please let this note serve as discharge summary. Please see case management note for discharge disposition. Thank you.

## 2022-02-10 NOTE — ED NOTES
Bed: 17  Expected date:   Expected time:   Means of arrival:   Comments:  [facility] [address], RN  02/10/22 5187

## 2022-02-11 NOTE — ED PROVIDER NOTES
EMERGENCY DEPARTMENT ENCOUNTER      This patient was seen and evaluated by the attending physician. Pt Name: Harman Hair  MRN: [id number]  [place] 1935  Date of evaluation: 2/10/2022  Provider: SAUL Johnson - CNP-C  PCP: LEVAR Correa  ED Attending: Dr. Reji Moreira    History provided by the patient family    CHIEF COMPLAINT:     Chief Complaint   Patient presents with    Altered Mental Status     per EMS patient comes from home for increased confusion per patients family member. Patient is only alert to self upon ED arrvival. EMS states patient has history of dementia. Patient denies complaints. HISTORY OF PRESENT ILLNESS:      Harman Hair is a 80 y.o. female who presents to [facility] ER with complaints of general weakness. Patient family at the bedside states the patient has been more weak recently also states that she has been more confused she does have a history of dementia. He states that he is unable to care for her anymore in her current state. Patient is pleasantly disoriented. She does not appear to be acutely distressed, here for further evaluation. Location:-  Quality:-  Severity:-  Duration:-  Modifying factors:-    Nursing Notes were reviewed     REVIEW OF SYSTEMS:     Review of Systems  All systems, atotal of 10, are reviewed and negative except for those that were just noted in history present illness. PAST MEDICAL HISTORY:     Past Medical History:   Diagnosis Date    Dementia ([address]     Diastolic heart failure (HCC)     Grade I per echo in 2019    Prediabetes          SURGICAL HISTORY:    History reviewed. No pertinent surgical history.       CURRENT MEDICATIONS:       Current Discharge Medication List      CONTINUE these medications which have NOT CHANGED    Details   sertraline (ZOLOFT) 50 MG tablet Take 1 tablet by mouth daily  Qty: 90 tablet, Refills: 1    Associated Diagnoses: Anxiety      traZODone (DESYREL) 100 MG tablet Take 1.5 tablets by mouth nightly  Qty: 135 tablet, Refills: 1    Associated Diagnoses: Psychophysiological insomnia      donepezil (ARICEPT) 10 MG tablet Take 1 tablet by mouth nightly  Qty: 90 tablet, Refills: 1    Associated Diagnoses: Dementia without behavioral disturbance, unspecified dementia type (HCC)      furosemide (LASIX) 20 MG tablet Take 1 tablet by mouth daily  Qty: 90 tablet, Refills: 1    Associated Diagnoses: Bilateral lower extremity edema; Venous insufficiency (chronic) (peripheral)               ALLERGIES:    Patient has no known allergies. FAMILY HISTORY:       Family History   Problem Relation Age of Onset    Heart Attack Brother           SOCIAL HISTORY:       Social History     Socioeconomic History    Marital status:      Spouse name: None    Number of children: None    Years of education: None    Highest education level: None   Occupational History    None   Tobacco Use    Smoking status: Never Smoker    Smokeless tobacco: Never Used   Vaping Use    Vaping Use: Never used   Substance and Sexual Activity    Alcohol use: No    Drug use: No    Sexual activity: Never   Other Topics Concern    None   Social History Narrative    None     Social Determinants of Health     Financial Resource Strain: Medium Risk    Difficulty of Paying Living Expenses: Somewhat hard   Food Insecurity: No Food Insecurity    Worried About Running Out of Food in the Last Year: Never true    Lam of Food in the Last Year: Never true   Transportation Needs: No Transportation Needs    Lack of Transportation (Medical): No    Lack of Transportation (Non-Medical):  No   Physical Activity:     Days of Exercise per Week: Not on file    Minutes of Exercise per Session: Not on file   Stress:     Feeling of Stress : Not on file   Social Connections:     Frequency of Communication with Friends and Family: Not on file    Frequency of Social Gatherings with Friends and Family: Not on file    Attends Restoration Services: Not on file    Active Member of Clubs or Organizations: Not on file    Attends Club or Organization Meetings: Not on file    Marital Status: Not on file   Intimate Partner Violence:     Fear of Current or Ex-Partner: Not on file    Emotionally Abused: Not on file    Physically Abused: Not on file    Sexually Abused: Not on file   Housing Stability: [address] Unable to Pay for Housing in the Last Year: No    Number of [place] in the Last Year: 1    Unstable Housing in the Last Year: No       SCREENINGS:   [place] Coma Scale  Eye Opening: Spontaneous  Best Verbal Response: Confused  Best Motor Response: Obeys commands  [place] Coma Scale Score: 14        PHYSICAL EXAM:       ED Triage Vitals   BP Temp Temp Source Pulse Resp SpO2 Height Weight   02/10/22 1510 02/10/22 1510 02/10/22 1510 02/10/22 1510 02/10/22 1503 02/10/22 1510 -- 02/10/22 1503   (!) 177/85 99.9 °F (37.7 °C) Oral 66 18 90 %  200 lb (90.7 kg)       Physical Exam    CONSTITUTIONAL: Awake and alert. Chronically ill appearing female  Vitals:    02/10/22 2218 02/10/22 2225 02/10/22 2228 02/10/22 2233   BP: (!) 176/61 (!) 158/72 (!) 175/62 122/68   Pulse:       Resp:       Temp:       TempSrc:       SpO2:       Weight:         HENT: Normocephalic. Atraumatic. External ears normal, without discharge. TMs clear bilaterally. No nasal discharge. Oropharynx clear, no erythema. Mucous membranes moist.  EYES: Conjunctiva non-injected, no lid abnormalities noted. No scleral icterus. PERRL. EOM's grossly intact. Anterior chambers clear. NECK: Supple. Normal ROM. No meningismus. No thyroid tenderness or swelling noted. CARDIOVASCULAR: RRR. No [place]. PULMONARY/CHEST WALL: Effort normal. No tachypnea. slight crackles noted bilaterally. ABDOMEN: Normal BS. Soft. Nondistended. No tenderness to palpate. No guarding. No hernias noted. No splenomegaly. Back: Spine is midline. No ecchymosis. No crepitus on palpation.  No obvious subluxation of vertebral column. No saddle anesthesia or evidence of cauda equina. /ANORECTAL: Not assessed  MUSKULOSKELETAL: Normal ROM. No acute deformities. No edema. No tenderness to palpate. SKIN: Warm and dry. NEUROLOGICAL:  GCS 15. CN II-XII grossly intact. PSYCHIATRIC: pleasantly confused.         DIAGNOSTIC RESULTS:     LABS:    Results for orders placed or performed during the hospital encounter of 02/10/22   SARS-CoV-2 NAAT (Rapid)    Specimen: Nasopharyngeal Swab   Result Value Ref Range    SARS-CoV-2, NAAT DETECTED (AA) Not Detected   CBC auto differential   Result Value Ref Range    WBC 7.5 4.0 - 11.0 K/uL    RBC 3.92 (L) 4.00 - 5.20 M/uL    Hemoglobin 11.3 (L) 12.0 - 16.0 g/dL    Hematocrit 34.6 (L) 36.0 - 48.0 %    MCV 88.3 80.0 - 100.0 fL    MCH 28.7 26.0 - 34.0 pg    MCHC 32.6 31.0 - 36.0 g/dL    RDW 14.9 12.4 - 15.4 %    Platelets 010 314 - 591 K/uL    MPV 8.1 5.0 - 10.5 fL    Neutrophils % 91.3 %    Lymphocytes % 3.9 %    Monocytes % 4.7 %    Eosinophils % 0.0 %    Basophils % 0.1 %    Neutrophils Absolute 6.9 1.7 - 7.7 K/uL    Lymphocytes Absolute 0.3 (L) 1.0 - 5.1 K/uL    Monocytes Absolute 0.4 0.0 - 1.3 K/uL    Eosinophils Absolute 0.0 0.0 - 0.6 K/uL    Basophils Absolute 0.0 0.0 - 0.2 K/uL   Comprehensive metabolic panel   Result Value Ref Range    Sodium 140 136 - 145 mmol/L    Potassium 4.2 3.5 - 5.1 mmol/L    Chloride 104 99 - 110 mmol/L    CO2 25 21 - 32 mmol/L    Anion Gap 11 3 - 16    Glucose 156 (H) 70 - 99 mg/dL    BUN 33 (H) 7 - 20 mg/dL    CREATININE 0.8 0.6 - 1.2 mg/dL    GFR Non-African American >60 >60    GFR African American >60 >60    Calcium 8.8 8.3 - 10.6 mg/dL    Total Protein 6.3 (L) 6.4 - 8.2 g/dL    Albumin 3.7 3.4 - 5.0 g/dL    Albumin/Globulin Ratio 1.4 1.1 - 2.2    Total Bilirubin 0.5 0.0 - 1.0 mg/dL    Alkaline Phosphatase 126 40 - 129 U/L    ALT 62 (H) 10 - 40 U/L    AST 58 (H) 15 - 37 U/L   Troponin   Result Value Ref Range    Troponin <0.01 <0.01 ng/mL Lactate, Sepsis   Result Value Ref Range    Lactic Acid, Sepsis 1.1 0.4 - 1.9 mmol/L   Urinalysis   Result Value Ref Range    Color, UA DARK YELLOW (A) Straw/Yellow    Clarity, UA Clear Clear    Glucose, Ur Negative Negative mg/dL    Bilirubin Urine Negative Negative    Ketones, Urine Negative Negative mg/dL    Specific Gravity, UA >=1.030 1.005 - 1.030    Blood, Urine Negative Negative    pH, UA 5.5 5.0 - 8.0    Protein, UA 30 (A) Negative mg/dL    Urobilinogen, Urine 1.0 <2.0 E.U./dL    Nitrite, Urine Negative Negative    Leukocyte Esterase, Urine Negative Negative    Microscopic Examination YES     Urine Type NotGiven    Microscopic Urinalysis   Result Value Ref Range    WBC, UA None seen 0 - 5 /HPF    RBC, UA 0-2 0 - 4 /HPF    Bacteria, UA Rare (A) None Seen /HPF    Amorphous, UA Rare /HPF   D-Dimer, Quantitative   Result Value Ref Range    D-Dimer, Quant 381 (H) 0 - 229 ng/mL DDU   APTT   Result Value Ref Range    aPTT 35.1 26.2 - 38.6 sec   Protime-INR   Result Value Ref Range    Protime 13.9 (H) 9.9 - 12.7 sec    INR 1.22 (H) 0.88 - 1.12   Lactic Acid, Plasma   Result Value Ref Range    Lactic Acid 1.4 0.4 - 2.0 mmol/L         RADIOLOGY:  All x-ray studies are viewed/reviewedby me. Formal interpretations per the radiologist are as follows:      XR CHEST PORTABLE   Final Result   Mild cardiomegaly. Left lung base atelectasis/infiltrate/small left pleural   effusion.                  EKG:  See EKG interpretation by an attending phsyician      PROCEDURES:   N/A    CRITICAL CARE TIME:       CONSULTS:  IP CONSULT TO HEART FAILURE NURSE/COORDINATOR  IP CONSULT TO DIETITIAN  IP CONSULT TO SOCIAL WORK      EMERGENCYDEPARTMENT COURSE and DIFFERENTIAL DIAGNOSIS/MDM:   Vitals:    Vitals:    02/10/22 2218 02/10/22 2225 02/10/22 2228 02/10/22 2233   BP: (!) 176/61 (!) 158/72 (!) 175/62 122/68   Pulse:       Resp:       Temp:       TempSrc:       SpO2:       Weight:             Patient was evaluated by both myself and Dr. Chan Li    Patient presented to the emergency room today with complaints general weakness, family member at the bedside gives most the history patient is at her baseline mentally but does have dementia. Her physical exam was fairly unremarkable she does appear very weak, she had some crackles noted to the lung fields. Her work-up today did show that she was positive for COVID-19 although not hypoxic and in no respiratory distress. I did have physical therapy work with the patient, patient unable to even get out of bed, I do not feel that she is safe to be discharged home given her general weakness, discussed case with the attending physician as well as hospitalist who agrees with plan for admission. Patient likely will need placement. Patient laboratory studies, radiographic imaging, andassessment were all discussed with the patient and/or patient family. There was shared decision-making between myself, the attending physician, as well as the patient and/or their surrogate and we are all in agreement with admission. There was an opportunity for questions and all questions were answered to the best of my ability and to the satisfaction of the patient and/or patient family. FINAL IMPRESSION:      1. General weakness    2. COVID-19          DISPOSITION/PLAN:   DISPOSITIONAdmitted      PATIENT REFERRED TO:  No follow-up provider specified.     DISCHARGE MEDICATIONS:  Current Discharge Medication List                     (Please note that portions of this note were completed with a voice recognition program.  Efforts were made to edit the dictations, but occasionally words are mis-transcribed.)    SAUL Quezada CNP-C (electronically signed)        SAUL Quezada CNP  02/10/22 9690

## 2022-02-11 NOTE — ED NOTES
Pt's significant other/medical POA took pt belongings with him tonight when he left for home.      Honey Webster RN  02/10/22 2110

## 2022-02-11 NOTE — ACP (ADVANCE CARE PLANNING)
ADVANCED CARE PLANNING - REMOTE     Velia Good       :  1935              MRN:  [id number]  Admission Date: 2/10/2022  3:01 PM  Date of Discussion:  2022      Purpose of Encounter: Advanced care planning in light of dementia and COVID-19. Parties in attendance: :Mahnaz Price MD, Family members:Jem Mahan. Decisional Capacity:No    Remote ACP visit was performed based on new provisions and guidance offered by [facility] on 2020 in setting of COVID 19 outbreak and in order to preserve personal protective equipment in accordance with the flexibilities announced by CMS on 2020. References:   https://[facility]. [place]/Portals/0/Resources/COVID-19/3_18%20Telemed%20Guidance%20Updated%20March%2018. pdf?amk=[email or web address]   [email or web address]/. pdf     Objective/Medical Story: In brief this is an 80 yoF who was brought in by her significant other overnight for 1 week of increased weakness and fatigue with likely tactile fevers at home who was found to be covid-19 positive. She remains on RA but her mental status is deteriorated. POA says he has the paperwork and will bring it in tomorrow. He says she was forgetful but otherwise functional with most ADLS up until 1 week ago. Prior to increased fatigue and confusion pt and POA discussed GOC. She is very \"Holiness\" per POA and she would not want life prolonging measures unless they kept her alive long enough to have a meaningnful interaction with family to say good bye. When we discussed resuscitation measures he said she would not want that unless as a bridge to see family. When we discussed life support he did not feel that would bring her the quality of life she would want or the meaningful interaction she would desire. He  requested limited code with medical mgmt after we discussed this further. Active Diagnoses:     Active

## 2022-02-11 NOTE — CARE COORDINATION
CASE MANAGEMENT INITIAL ASSESSMENT      Reviewed chart and completed assessment with patient: Spoke to pt's ANTONI Parish   Explained Case Management role/services. Primary contact information: [phone] Decision Maker :   Primary Decision Maker: Jem Mahan - Other - [phone]        Admit date/status: 2/10/22   Diagnosis: General weakness, Covid   Is this a Readmission?: No    Insurance: [employer]   Precert required for SNF: Y      3 night stay required: N    Living arrangements, Adls, care needs, prior to admission: Pt lives at home with her friend Merlin Mcardle and was fairly independent prior to admission     Transportation: No preference     Durable Medical Equipment at home:  Walker__Cane_X_RTS__ BSC__Shower Chair__  02__ HHN__ CPAP__  BiPap__  Hospital Bed__ W/C___ Other__________    Services in the home and/or outpatient, prior to admission: None     Dialysis Facility (if applicable)N/A    · Name:  · Address:  · Dialysis Schedule:  · Phone:  · Fax:    PT/OT recs: [facility] [address] Exemption Notification (HEN): Needed for SNF     Barriers to discharge: None     Plan/comments: 2/11/22 Spoke to Merlin Mcardle about PT/OT recs for SNF. He agrees that [facility] needs a SNF. He requested a referral to [facility], referral made to [facility],. [facility] can not accept Covid +. Jem requested a back up referral to [facility].      ECOC on chart for MD signature

## 2022-02-11 NOTE — PROGRESS NOTES
Comprehensive Nutrition Assessment    Type and Reason for Visit:  Initial    Nutrition Recommendations/Plan:   1. Continue clear liquid diet - advance diet per MD  2. Add Ensure Clear ONS - switch to Ensure Compact BID when possible  3. Encourage po intakes  4. Monitor po intakes, nutrition adequacy, weights, pertinent labs, BMs    Nutrition Assessment:  Consult received for diet education. Pt admitted with increased weakness and confusion at home. Hx of dementia. COVID+. Pt not appropriate for diet education at this time. Pt currently on a clear liquid diet. No po intakes recorded yet. Weight Hx appears to be trending down per EMR. Difficult to assess d/t lack of actual weights. Will add ONS to help pt meet nutrition needs. Will monitor and address diet education when appropriate per pt and RD availability. Malnutrition Assessment:  Malnutrition Status: At risk for malnutrition (Comment)      Estimated Daily Nutrient Needs:  Energy (kcal):  7363-1240; Weight Used for Energy Requirements:  Current (85 kg)     Protein (g):  57-67; Weight Used for Protein Requirements:  Ideal (1.2-1.4 g/kg)        Fluid (ml/day):1 ml/kcal      Nutrition Related Findings:  +2 pitting BLE edema. Hypoactive BS. Wounds:  None       Current Nutrition Therapies:    ADULT DIET; Clear Liquid;  Low Fat/Low Chol/High Fiber/2 gm Na    Anthropometric Measures:  · Height: 5' 1\" (154.9 cm)  · Current Body Weight: 187 lb 6.3 oz (85 kg)   · Ideal Body Weight: 105 lbs; % Ideal Body Weight 178.5 %   · BMI: 35.4  · BMI Categories: Obese Class 2 (BMI 35.0 -39.9)       Nutrition Diagnosis:   · Predicted inadequate energy intake related to cognitive or neurological impairment as evidenced by intake 0-25% (no po intakes recorded since admission)    Nutrition Interventions:   Food and/or Nutrient Delivery:  Continue Current Diet  Nutrition Education/Counseling:  Education not appropriate   Coordination of Nutrition Care:  Continue to monitor while inpatient    Goals:  Pt will have po intakes 50% or greater this admission       Nutrition Monitoring and Evaluation:   Behavioral-Environmental Outcomes:  None Identified   Food/Nutrient Intake Outcomes:  Food and Nutrient Intake,Supplement Intake  Physical Signs/Symptoms Outcomes:  Weight     Discharge Planning:    Continue current diet     Electronically signed by Heidi Piper RD, LD on 2/11/22 at 1:46 PM EST    Contact: [phone]

## 2022-02-11 NOTE — PROGRESS NOTES
Pt arrived to C5 in stable condition. VS taken. Call light given, bed alarm on. Pt oriented to room.

## 2022-02-11 NOTE — PLAN OF CARE
Problem: Nutrition  Goal: Optimal nutrition therapy  Outcome: Ongoing  Note: Nutrition Problem #1: Predicted inadequate energy intake  Intervention: Food and/or Nutrient Delivery: Continue Current Diet  Nutritional Goals: Pt will have po intakes 50% or greater this admission

## 2022-02-11 NOTE — H&P
Hospital Medicine History & Physical      PCP: LEVAR Bishop    Date of Admission: 2/10/2022    Date of Service: Pt seen/examined on 02/10/2022 and Admitted to Inpatient with expected LOS greater than two midnights due to medical therapy. Chief Complaint:  Increased weakness at home    History Of Present Illness:      Ms. Kimberley Dunham is an 49-year-old female with an unknown past medical history due to no family member at bedside who was brought here by her significant other (SO) due to having increased weakness at home. ED provider notes a baseline dementia on history which is corroborated by dementia on home medications. Her SO is her primary caretaker and notes she's been more confused that usual and increasingly needing more assistance in the last few days. She had a virtual visit today which there was report of nausea and vomiting, but there was no witness of vomiting here and the SO did not mention it to the ED provider today. No report of chest pain, dyspnea, cough, or fevers at home. She is unable to provide any subjective data herself. Her evaluation tonight included laboratory studies and chest x-ray. CXR showed mild cardiomegaly without acute pulmonary processes. Pertinent lab values include BUN of 33, initial lactic acid of 1.1, serum glucose 156, and elevated ALT/AST 62/58. Cell count showed WBC 7.5, H/H 11.3/34.6, and platelets 463. Covid testing was positive today. Urinalysis showed dark yellow urine, spec grav > 1.030, and 30 protein. Rare bacteria seen on microscopy without evidence of LE or nitrites. Blood cultures are pending. She received no interventions in the ED. Hospital team was consulted to admit this patient for increased weakness in the setting of dementia and incidental COVID. Past Medical History:          Diagnosis Date    Dementia ([address]     Diastolic heart failure (HCC)     Grade I per echo in 2019    Prediabetes      Past Surgical History:      History reviewed.  No pertinent surgical history. Medications Prior to Admission:      Prior to Admission medications    Medication Sig Start Date End Date Taking? Authorizing Provider   sertraline (ZOLOFT) 50 MG tablet Take 1 tablet by mouth daily 12/22/21   LEVAR Diaz   traZODone (DESYREL) 100 MG tablet Take 1.5 tablets by mouth nightly 12/9/21 3/9/22  LEVAR Diaz   donepezil (ARICEPT) 10 MG tablet Take 1 tablet by mouth nightly 11/23/21   SAUL Moran CNP   furosemide (LASIX) 20 MG tablet Take 1 tablet by mouth daily 5/20/21   LEVAR Diaz     Allergies:  Patient has no known allergies. Social History:      The patient currently lives at home with significant other. TOBACCO:   reports that she has never smoked. She has never used smokeless tobacco.  ETOH:   reports no history of alcohol use. E-Cigarettes/Vaping Use     Questions Responses    E-Cigarette/Vaping Use Never User    Start Date     Passive Exposure     Quit Date     Counseling Given     Comments         Family History:      Reviewed in detail; positive as follows:        Problem Relation Age of Onset    Heart Attack Brother      REVIEW OF SYSTEMS COMPLETED:   Pertinent positives as noted in the HPI. All other systems reviewed and negative. PHYSICAL EXAM PERFORMED:    BP (!) 198/74   Pulse 57   Temp 98.5 °F (36.9 °C) (Oral)   Resp 18   Wt 200 lb (90.7 kg)   SpO2 90%   BMI 37.79 kg/m²     General appearance:  No apparent distress, appears stated age and cooperative. HEENT:  Normal cephalic, atraumatic without obvious deformity. Pupils equal, round, and reactive to light. Extra ocular muscles intact. Conjunctivae/corneas clear. Neck: Supple, with full range of motion. No jugular venous distention noted on exam. Trachea midline. Respiratory:  Normal respiratory effort.  Faint crackles noted bibasilar in posterior zones, otherwise clear  Cardiovascular:  Regular rate and rhythm with normal S1/S2 without murmurs, rubs or gallops. No LE edema. Abdomen: Soft, non-tender, non-distended with normal bowel sounds. Musculoskeletal:  No clubbing, cyanosis or edema bilaterally. Full range of motion without deformity. Skin: Skin color, texture, turgor normal.  No rashes or lesions. Neurologic:  Neurovascularly intact without any focal sensory/motor deficits. Cranial nerves: II-XII intact, grossly non-focal.  Psychiatric:  Alert and oriented to self only, can be cantankerous with staff when interventions are done. Capillary Refill: Brisk,3 seconds, normal  Peripheral Pulses: +2 palpable, equal bilaterally     Labs:     Recent Labs     02/10/22  1325   WBC 7.5   HGB 11.3*   HCT 34.6*        Recent Labs     02/10/22  1325      K 4.2      CO2 25   BUN 33*   CREATININE 0.8   CALCIUM 8.8     Recent Labs     02/10/22  1325   AST 58*   ALT 62*   BILITOT 0.5   ALKPHOS 126     No results for input(s): INR in the last 72 hours. Recent Labs     02/10/22  1325   TROPONINI <0.01     Urinalysis:      Lab Results   Component Value Date    NITRU Negative 02/10/2022    WBCUA None seen 02/10/2022    BACTERIA Rare 02/10/2022    RBCUA 0-2 02/10/2022    BLOODU Negative 02/10/2022    SPECGRAV >=1.030 02/10/2022    GLUCOSEU Negative 02/10/2022     Radiology:     EKG:  I have reviewed the EKG with the following interpretation: pending; not completed in ED. Echocardiogram from 08/2019:    ECHO Complete 2D W Doppler W Color  Order: [id number]   Status: Edited Result - FINAL     Visible to patient: No (not released)     Next appt: None     Dx:  Peripheral edema; Systolic murmur     2 Result Notes    Details    Reading Physician Reading Date Result Priority   Sandra Cazares MD  [phone] 8/21/2019    Unknown Provider Result 8/21/2019      Narrative & Impression  Transthoracic Echocardiography Report (TTE)      Demographics      Patient Name       Marshall ROBISON      Date of Study      08/21/2019         Gender              Female Patient Number     [id number]         Date of Birth       1935      Visit Number       [id number]          Age                 80 year(s)      Accession Number   [id number]          Room Number         OP      Corporate ID       [id number]           Sonographer         Katherine Patton [facility]      Ordering Physician Tamiko Mckinney, Interpreting        [address] [place] [address].                      CNP                Physician           Beatriz Montague MD, [facility]     Procedure     Type of Study      TTE procedure:ECHOCARDIOGRAM COMPLETE 2D W DOPPLER W COLOR. Procedure Date  Date: 08/21/2019 Start: 10:50 AM     [facility]  Technical Quality: Adequate visualization     Indications:Heart murmur.     Patient Status: Routine     Height: 67 inches Weight: 200 pounds BSA: 2.02 m2 BMI: 31.32 kg/m2     BP: 142/84 mmHg      Conclusions      Summary   Normal left ventricle systolic function with an estimated ejection fraction   of 55%. No regional wall motion abnormalities are seen. Grade I diastolic dysfunction with normal filing pressure. Mild aortic regurgitation. Mild mitral and pulmonic regurgitation. Signature      ------------------------------------------------------------------   Electronically signed by Beatriz Montague MD, [facility]   (Interpreting physician) on 08/21/2019 at 01:21 PM   ------------------------------------------------------------------      Findings      Left Ventricle   Normal left ventricle size, wall thickness and systolic function with an   estimated ejection fraction of 55%. No regional wall motion abnormalities are seen. Grade I diastolic dysfunction with normal filing pressure. Mitral Valve   The mitral valve is normal in structure. Mild mitral regurgitation. Left Atrium   The left atrium is normal in size.       Aortic Valve   Aortic valve appears sclerotic but opens adequately. Mild aortic regurgitation. Aorta   The aortic root is normal in size. Right Ventricle   The right ventricle is normal in size and function. TAPSE is measured at 23 mm. S'' prime velocity is measured at 14.3 cm/s. Tricuspid Valve   The tricuspid valve is normal in structure and function. Trace tricuspid regurgitation. Systolic pulmonary artery pressure (SPAP) is normal and estimated at 27 mmHg   (right atrial pressure 3 mmHg). Right Atrium   The right atrium is normal in size. Right atrial area is 12.4cm2. Pulmonic Valve   The pulmonic valve is normal in structure. Mild pulmonic regurgitation. Pericardial Effusion   No pericardial effusion noted. Pleural Effusion   No pleural effusion. Miscellaneous   No obvious masses, thrombi or vegetations are noted. IVC is normal in size (< 2.1 cm) and collapses > 50% with respiration   consistent with normal right atrial pressure (3 mmHg).      M-Mode/2D Measurements (cm)      LV Diastolic Dimension: 5.44 cm LV Systolic Dimension: 4.69 cm   LV Septum Diastolic: 4.77 cm   LV PW Diastolic: 0.79 cm        AO Root Dimension: 3 cm                                   AV Cusp Separation: 1.4 cm                                   LA Dimension: 3.6 cm                                   LA Area: 20.7 cm2                                   LA volume/Index: 63 ml /31 ml/m2     Doppler Measurements      AV Peak Velocity: 160 cm/s     MV Peak E-Wave: 80.9 cm/s   AV Peak Gradient: 10.24 mmHg   MV Peak A-Wave: 122 cm/s   LVOT Peak Velocity: 83.4 cm/s  MV E/A Ratio: 0.66      TR Velocity:244 cm/s   TR Gradient:23.81 mmHg   Estimated RAP:3 mmHg   Estimated RVSP: 27 mmHg   E' Septal Velocity: 4.97 cm/s   E' Lateral Velocity: 6.82 cm/s      Aortic Valve      Peak Velocity: 160 cm/s   Peak Gradient: 10.24 mmHg      AI P1/2t: 481 msec   Cusp Separation: 1.4 cm     Aorta      Aortic Root: 3 cm        Component 2 yr ago   Left Ventricular Ejection Fraction 55    LVEF MODALITY ECHO         CXR: I have reviewed the CXR with the following interpretation; see report below:    XR CHEST PORTABLE   Final Result   Mild cardiomegaly. Left lung base atelectasis/infiltrate/small left pleural   effusion. 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] 02/10/2022     Priority: Medium    Elevated random blood glucose level [R73.09] 02/10/2022     Priority: Medium     PLAN:    1.) Generalized weakness  - Reported by SO from home who is primary caretaker  - PT/OT evaluated patient in ED and noted significant weakness  - Consult  for placement into long-term care facility upon discharge    2.) Hypertension, uncontrolled  - BP readings elevated in ED  - No active antihypertensive medications on home med rec aside Lasix  - Add PRN metoprolol IVP    3.) COVID viremia  - Check inflammatory markers  - Satting quite well on RA without significant pulmonary findings  - Unable to verify vaccination status  - Hold on treatments until symptoms become salient    4.) H/o of CHF  - Again, no report from ED about this and SO not at bedside  - Grade I diastolic dysfunction on echo in 2019 and on Lasix use  - Mild cardiomegaly with crackles noted, but no overt findings to suggest exacerbation  - Continue oral Lasix  - Check BNP tonight  - Consult HF nurse and dietician    5.) Dementia  - Continue Aricept, Desyrel, and Zoloft    6.) Medical frailty  - Significant dementia with increased assistance needs      DVT Prophylaxis: Lovenox  Diet: ADULT DIET; Clear Liquid;  Low Fat/Low Chol/High Fiber/2 gm Na  Code Status: Full Code    PT/OT Eval Status: Consulted    Dispo - 1-2 days per clinical improvement       Karri Loco, APRN - CNP    Thank you LEVAR Mathis for the opportunity to be involved in this patient's care.  If you have any questions or concerns please feel free to contact me at [phone].--------Anticipated co-signer, Dr. Yusra Devi

## 2022-02-11 NOTE — ED PROVIDER NOTES
I independently performed a history and physical on [facility]. All diagnostic, treatment, and disposition decisions were made by myself in conjunction with the advanced practice provider. For further details of [address] [place] [address] [address] emergency department encounter, please see the MAYCOL/resident's documentation. I personally saw the patient and performed a substantive portion of the visit including all aspects of the medical decision making. Briefly, this is a 41-year-old female with history of dementia, hypertension who presents for evaluation of altered mental status. She also has found to be generally weak. She is not able to get up from the bed. Patient does not have any specific complaints. On exam, no respiratory distress, no focal neurological deficits. She was found to be positive for COVID-19. This could be contributing to delirium on top of her chronic dementia. Because of her generalized weakness, she will require admission for placement. Family is agreeable with this.      Chucky Dodson MD  02/10/22 [id number]

## 2022-02-11 NOTE — PROGRESS NOTES
4 Eyes Skin Assessment     The patient is being assess for   Admission    I agree that 2 RN's have performed a thorough Head to Toe Skin Assessment on the patient. ALL assessment sites listed below have been assessed. Areas assessed for pressure by both nurses:   [x]   Head, Face, and Ears   [x]   Shoulders, Back, and Chest, Abdomen  [x]   Arms, Elbows, and Hands   [x]   Coccyx, Sacrum, and Ischium  [x]   Legs, Feet, and Heels        Skin Assessed Under all Medical Devices by both nurses:       All Mepilex Borders were peeled back and area peeked at by both nurses:    Please list where Mepilex Borders are located:               **SHARE this note so that the co-signing nurse is able to place an eSignature**    Co-signer eSignature: {Esignature:[id number]}    Does the Patient have Skin Breakdown related to pressure?   No       Michael Prevention initiated:  No   Wound Care Orders initiated:  No      WOC nurse consulted for Pressure Injury (Stage 3,4, Unstageable, DTI, NWPT, Complex wounds)and New or Established Ostomies:  No      Primary Nurse eSignature: Electronically signed by Joyce Irene RN on 2/11/22 at 12:20 AM EST

## 2022-02-11 NOTE — PROGRESS NOTES
Physical Therapy  Facility/Department: [facility] C5 - MED SURG/ORTHO  Daily Treatment Note  NAME: Bandar Suarez  : 1935  MRN: [id number]    Date of Service: 2022    Discharge Recommendations:  Subacute/Skilled Nursing Facility   PT Equipment Recommendations  Equipment Needed: No  Other: defer  If pt is unable to be seen after this session, please let this note serve as discharge summary. Please see case management note for discharge disposition. Thank you. Assessment   Body structures, Functions, Activity limitations: Decreased functional mobility ; Decreased cognition;Decreased endurance;Decreased balance;Decreased ADL status  Assessment: Pt continues to be very confused limiting participation. Pt agitated, cursing at staff but then quickly apologized. Frequent redirection and explanation of care needed as pt is confused and agitated. Pt was able to sit EOB x 5 minutes today needing Min-max A. Increased productive cough with positional changes. Pt returned to supine at EOS. Pt seen as co-tx for safe progressing of activity. Due to poor particpation and cognition, will decrease frequency to 2-3x/week. Treatment Diagnosis: decreased mobility  Prognosis: Good;Fair  Decision Making: Low Complexity  PT Education: Goals;Orientation;Disease Specific Education  Patient Education: no evidence of learning  Barriers to Learning: cognition  REQUIRES PT FOLLOW UP: Yes  Activity Tolerance  Activity Tolerance: Patient limited by cognitive status     Patient Diagnosis(es): The primary encounter diagnosis was General weakness. A diagnosis of COVID-19 was also pertinent to this visit. has a past medical history of Dementia ([address] Diastolic heart failure ([facility] [address] and Prediabetes. has no past surgical history on file.     Restrictions  Restrictions/Precautions  Restrictions/Precautions: Fall Risk  Subjective   General  Chart Reviewed: Yes  Response To Previous Treatment: Not applicable  Family / Caregiver Present: No  Referring Practitioner: SAUL Davis - CNP  Subjective  Subjective: pt resting in bed. Does not know why she is here. Denies pain  General Comment  Comments: cleared by nursing  Vital Signs  Pulse: 59  BP: (!) 177/76  Oxygen Therapy  SpO2: 90 %       Orientation  Orientation  Overall Orientation Status: Impaired  Orientation Level: Oriented to person;Disoriented to place; Disoriented to time;Disoriented to situation  Cognition      Objective   Bed mobility  Supine to Sit: 2 Person assistance;Maximum assistance  Sit to Supine: Maximum assistance;2 Person assistance  Scootin Person assistance;Maximal assistance  Transfers  Sit to Stand: Unable to assess           Exercises  Knee Long Arc Quad: x10 B      AM-PAC Score  AM-PAC Inpatient Mobility Raw Score : 9 (02/10/22 1638)  AM-PAC Inpatient T-Scale Score : 30.55 (02/10/22 1638)  Mobility Inpatient CMS 0-100% Score: 81.38 (02/10/22 1638)  Mobility Inpatient CMS G-Code Modifier : CM (02/10/22 1638)          Goals  Short term goals  Time Frame for Short term goals:   Short term goal 1: Pt will complete bed mobility with Min A. : max x 2  Short term goal 2: Pt will sit to stand with min A  Short term goal 3: Pt will ambulate x 10' with RW with min A  Short term goal 4: Pt will participate in B LE exercises to improve mobility by . : poor command following  Patient Goals   Patient goals : none stated due to confusion    Plan    Plan  Times per week: 2-3x/week  Current Treatment Recommendations: Strengthening,Balance Training,Endurance Training,Functional Mobility Training,Transfer Training  Safety Devices  Type of devices:  All fall risk precautions in place,Patient at risk for falls,Left in bed,Nurse notified,Call light within reach,Bed alarm in place  Restraints  Initially in place: No     Therapy Time   Individual Concurrent Group Co-treatment   Time In 1000         Time Out 1025         Minutes [address], [place], PT

## 2022-02-11 NOTE — ED NOTES
Called floor for report. Spoke with eBzacarias. Will call writer back in 5 mins.      Alejandro Ayala RN  02/10/22 2007       Alejandro Ayala RN  02/10/22 2028

## 2022-02-11 NOTE — PROGRESS NOTES
Hospitalist Progress Note      PCP: LEVAR Knowles    Date of Admission: 2/10/2022    Chief Complaint: increased weakness    Hospital Course: reviewed. In brief this is an 80 yoF who was brought in by her significant other overnight for 1 week of increased weakness and fatigue with likely tactile fevers at home who was found to be covid-19 positive. She remains on RA but her mental status is deteriorated. Subjective: pt ros negative. She is alert to self but not situation or place. She says she lives with mom and da but recognizes her significant other jerod who is by the door. She endorses discomfort in LLE when moved  But is able to move all legs spontaneously without discomfort at times      Medications:  Reviewed    Infusion Medications    sodium chloride      sodium chloride 50 mL/hr at 02/10/22 2224     Scheduled Medications    polyethylene glycol  17 g Oral BID    donepezil  10 mg Oral Nightly    furosemide  20 mg Oral Daily    sertraline  50 mg Oral Daily    traZODone  150 mg Oral Nightly    sodium chloride flush  5-40 mL IntraVENous 2 times per day    enoxaparin  30 mg SubCUTAneous BID     PRN Meds: labetalol, sodium chloride flush, sodium chloride, ondansetron **OR** ondansetron, polyethylene glycol, acetaminophen **OR** acetaminophen, guaiFENesin-dextromethorphan      Intake/Output Summary (Last 24 hours) at 2/11/2022 1401  Last data filed at 2/11/2022 0615  Gross per 24 hour   Intake --   Output 200 ml   Net -200 ml       Physical Exam Performed:    BP (!) 177/76   Pulse 59   Temp 99.4 °F (37.4 °C) (Oral)   Resp 18   Ht 5' 1\" (1.549 m)   Wt 187 lb 6.3 oz (85 kg)   SpO2 90%   BMI 35.41 kg/m²     General appearance: No apparent distress, appears stated age and cooperative. HEENT: Pupils equal, round, and reactive to light. Conjunctivae/corneas clear. Neck: Supple, with full range of motion. Respiratory:  Normal respiratory effort.  Clear to auscultation, anteriorly on immunomodulating medications  - Isolation end date at least 10 days per hospital policy from sxs onset but can be 10-20 days from symptom onset depending on severity of illness and underlying co-morbidities/immune status with evidence of improvement in symptoms and with 24 hours of no fever without the use of fever reducing medications. Isolation end date determined to be 02/13 (ten days from sxs onset)  - high risk for decompensation    htn-uncontrolled  - BP readings elevated in ED  - No active antihypertensive medications on home med rec aside Lasix  - continue PRN       H/o of CHF  - Grade I diastolic dysfunction on echo in 2019 and on Lasix use  - Mild cardiomegaly with crackles noted, but no overt findings to suggest exacerbation  - Continue oral Lasix  - Consult HF nurse and dietician     Dementia  - baseline ambulates with assistance, able to follow commands and converse though forgetful  - Continue Aricept, Desyrel, and Zoloft      Medical frailty  - Significant dementia with increased assistance needs    Chronic venous changes- f/u Ultrasound to rule out DVT. Low likelihood of infection at this time (no focal sxs)    DVT Prophylaxis: enoxaparin  Diet: ADULT DIET; Clear Liquid;  Low Fat/Low Chol/High Fiber/2 gm Na  ADULT ORAL NUTRITION SUPPLEMENT; Lunch; Clear Liquid Oral Supplement  Code Status: Full Code    PT/OT Eval Status: ordered    Dispo - SNF 02/12 pending clinical progression    Agapito Palma MD

## 2022-02-11 NOTE — FLOWSHEET NOTE
Pt verbally abusive/aggressive. Repeatedly telling RN that she is \"not worth a d---\" and to \"get the f--- away from me. \" Pt goes back and forth between verbally aggressive phrases and then pleasant \"okay\"s. Pt also spitting at RN.

## 2022-02-11 NOTE — PROGRESS NOTES
Occupational Therapy  Facility/Department: [facility] C5 - MED SURG/ORTHO  Daily Treatment Note  NAME: Chales Alpers  : 1935  MRN: [id number]    Date of Service: 2022    Discharge Recommendations:  Subacute/Skilled Nursing Facility     Assessment   Performance deficits / Impairments: Decreased functional mobility ; Decreased strength;Decreased endurance;Decreased ADL status; Decreased safe awareness;Decreased balance;Decreased cognition  Assessment: Session very limited by cognition, pt resisting all activities, briefly able to be reoriented to task/situation, but within seconds pt yelling at staff. Pt would benefit from continued skilled OT as participation allows. Continue to recommend SNF at d/c. Prognosis: Guarded  OT Education: OT Role;Plan of Care;Orientation  Barriers to Learning: cognition  REQUIRES OT FOLLOW UP: Yes  Activity Tolerance  Activity Tolerance: Treatment limited secondary to decreased cognition  Activity Tolerance: Unable to obtain vitals due to pt resistance, SPO2 briefly reading at 90%, unsure of accuracy  Safety Devices  Safety Devices in place: Yes  Type of devices: Left in bed;Bed alarm in place;Call light within reach;Nurse notified         Patient Diagnosis(es): The primary encounter diagnosis was General weakness. A diagnosis of COVID-19 was also pertinent to this visit. has a past medical history of Dementia ([address] Diastolic heart failure ([address] and Prediabetes. has no past surgical history on file.     Restrictions  Restrictions/Precautions  Restrictions/Precautions: Fall Risk,Isolation  Position Activity Restriction  Other position/activity restrictions: Covid+    Subjective   General  Chart Reviewed: Yes,Orders,Progress Notes,History and Physical,Imaging  Patient assessed for rehabilitation services?: Yes  Response to previous treatment: Patient with no complaints from previous session  Family / Caregiver Present: No  Referring Practitioner: SAUL Bryant - LALY 2/10/22  Diagnosis: AMS    Subjective  Subjective: Pt resting in bed, confused, agitated at times. Pt coughing up thick mucous during session, assisted in use of [place]. Vital Signs  Patient Currently in Pain:  (YANE)     Orientation  Orientation  Overall Orientation Status: Impaired  Orientation Level: Disoriented X4     Objective    ADL  LE Dressing: Dependent/Total  Toileting: Dependent/Total     Balance  Sitting Balance: Dependent/Total (pt resisting, pushing backward, EOB ~5 mins)  Standing Balance: Unable to assess (unsafe to trial)    Bed mobility  Rolling to Left: Dependent/Total  Rolling to Right: Dependent/Total  Supine to Sit: Dependent/Total;2 Person assistance  Sit to Supine: Dependent/Total;2 Person assistance     Cognition  Overall Cognitive Status: Exceptions  Arousal/Alertness: Inconsistent responses to stimuli  Following Commands: Inconsistently follows commands  Attention Span: Attends with cues to redirect; Difficulty attending to directions; Difficulty dividing attention  Memory: Decreased short term memory;Decreased long term memory;Decreased recall of precautions;Decreased recall of biographical Information;Decreased recall of recent events  Safety Judgement: Decreased awareness of need for safety;Decreased awareness of need for assistance  Insights: Not aware of deficits  Initiation: Requires cues for all  Sequencing: Requires cues for all        Plan   Plan  Times per week: 2-3x's a week while in acute care    AM-PAC Score  AM-[facility] Inpatient Daily Activity Raw Score: 7 (02/11/22 1140)  AM-PAC Inpatient ADL T-Scale Score : 20.13 (02/11/22 1140)  ADL Inpatient CMS 0-100% Score: 92.44 (02/11/22 1140)  ADL Inpatient CMS G-Code Modifier : CM (02/11/22 1140)    Goals  Short term goals  Time Frame for Short term goals: 1 week 2/17  Short term goal 1: Pt will complete LE dressing with mod-- ongoing 2/11/22  Short term goal 2: Pt will complete 10 reps of BUE AROM exercises to increase strength for ADLs/transfers by 2/15-- ongoing 2/11/22  Short term goal 3: Pt will complete EOB sitting 5 mins with Dick for balance-- ongoing 2/11/22  Short term goal 4: Anticipate mod A of 2 for functional transfers. -- ongoing 2/11/22  Patient Goals   Patient goals : YANE d/t cognition       Therapy Time   Individual Concurrent Group Co-treatment   Time In 6767         Time Out 1020         Minutes [address] [facility]

## 2022-02-11 NOTE — DISCHARGE INSTR - DIET
Good nutrition is important when healing from an illness, injury, or surgery. Follow any nutrition recommendations given to you during your hospital stay. If you were given an oral nutrition supplement while in the hospital, continue to take this supplement at home. You can take it with meals, in-between meals, and/or before bedtime. These supplements can be purchased at most local grocery stores, pharmacies, and chain super-stores. If you have any questions about your diet or nutrition, call the hospital and ask for the dietitian. Heart Failure Nutrition Therapy  This nutrition therapy will help you feel better and support your heart. This plan focuses on:  Limiting sodium in your diet. Salt (sodium) makes your body hold water. When your body holds too much water, you can feel shortness of breath and swelling. You can prevent these symptoms by eating less salt. Limiting fluid in your diet. For some patients, drinking too much fluid can make heart failure worse. It can cause symptoms such as shortness of breath and swelling. Limiting fluids can help relieve some of your symptoms. Managing your weight. Your registered dietitian nutritionist (RDN) can help you choose a healthy weight for your body type. You can achieve these goals by:  Reading food labels to keep track of how much sodium is in the foods you eat. Limiting foods that are high in sodium. Checking your weight to make sure you're not retaining too much fluid. Reading the Food Label: How Much Sodium Is Too Much? The nutrition plan for heart failure usually limits the sodium you get from food and drinks to 2,000 milligrams per day. Salt is the main source of sodium. Read the nutrition label to find out how much sodium is in 1 serving of a food. Select foods with 140 milligrams of sodium or less per serving. Foods with more than 300 milligrams of sodium per serving may not fit into a reduced-sodium meal plan. Check serving sizes.  If you eat more than 1 serving, you will get more sodium than the amount listed. Cutting Back on Sodium  Avoid processed foods. Eat more fresh foods. Fresh and frozen fruits and vegetables without added juices or sauces are naturally low in sodium. Fresh meats are lower in sodium than processed meats, such as pascual, sausage, and hot dogs. Read the nutrition label or ask your  to help you find a fresh meat that is low in sodium. Eat less salt, at the table and when cooking. Just 1 teaspoon of table salt has 2,300 milligrams of sodium. Leave the salt out of recipes for pasta, casseroles, and soups. Ask your RDN how to cook your favorite recipes without sodium. Be a smart . Look for food packages that say salt-free or sodium-free.  These items contain less than 5 milligrams of sodium per serving. Very-low-sodium products contain less than 35 milligrams of sodium per serving. Low-sodium products contain less than 140 milligrams of sodium per serving. Unsalted or no added salt products may still be high in sodium. Check the nutrition label. Add flavors to your food without adding sodium. Try lemon juice, lime juice, fruit juice, or vinegar. Dry or fresh herbs add flavor. Try basil, bay leaf, dill, rosemary, parsley, rubin, dry mustard, nutmeg, thyme, and paprika. Pepper, red pepper flakes, and cayenne pepper can add spice to your meals without adding sodium. Hot sauce contains sodium, but if you use just a drop or two, it will not add up to much. Buy a sodium-free seasoning blend or make your own at home. Use caution when you eat outside your home. Restaurant foods can be very high in sodium. Ask for nutrition information. Many restaurants provide nutrition facts on their menus or websites. Let your  know that you want your food to be cooked without salt. Ask for your salad dressing and sauces to come on the side.     Fluid Restriction  Your doctor may ask you to follow a fluid restriction in addition to taking diuretics (water pills). Ask your doctor how much fluid you can have. Foods that are liquid at room temperature are considered a fluid, such as popsicles, soup, ice cream, and Jell-O. Here are some common conversions that will help you measure your fluid intake every day:  1,000 milliliters = 1 liter or 4 cups 1 fluid ounce = 30 milliliters   1 cup = 240 milliliters 2,000 milliliters = 2 liters or 8 cups   1,500 milliliters = 1½ liters or 6 cups       Weight Monitoring  Weigh yourself each day. Sudden weight gain is a sign that fluid is building up in your body. Follow these guidelines:  Weigh yourself every morning. If you gain 3 or more pounds in 1-2 days or 5 or more pounds within 1 week, call your doctor. Your doctor may adjust your medicine to get rid of the extra fluid. Talk with your doctor or RDN about what a healthy weight is for you. Talk with your doctor to find out what type of physical activity is best for you.       Foods Recommended  Food Group Recommended Foods   Grains Bread with less than 80 milligrams sodium per slice (yeast breads usually have less sodium than those made with baking soda)  Homemade bread made with reduced-sodium baking soda  Many cold cereals, especially shredded wheat and puffed rice  Oats, grits, or cream of wheat  Dry pastas, noodles, quinoa, and rice   Vegetables Fresh and frozen vegetables without added sauces, salt, or sodium  Homemade soups (salt free or low sodium)  Low-sodium or sodium-free canned vegetables and soups   Fruits Fresh and canned fruits  Dried fruits, such as raisins, cranberries, and prunes   Dairy (Milk and Milk Products) Milk or milk powder  Rice milk and soy milk  Yogurt, including Greek yogurt  Small amounts of natural, block cheese or reduced-sodium cheese (Swiss, ricotta, and fresh mozzarella are lower in sodium than others)  Regular or soft cream cheese and low-sodium cottage cheese   Protein Foods (Meat, Poultry, Fish, [employer]) [employer] and fish  [place] ([facility]) pascual (except if packaged in a sodium solution)  Canned or packed tuna (no more than 4 ounces at 1 serving)  Dried beans and peas; edamame (fresh soybeans)  Eggs or egg beaters (if  less than 200 mg per serving)  Unsalted nuts or peanut butter   Desserts and Snacks Fresh fruit or applesauce  Harlan food cake  Granola bars  Unsalted pretzels, popcorn, or nuts  Pudding or Jell-O with Cool-Whip topping  Homemade rice-crispy treats  Vanilla wafers  Frozen fruit bars   Fats Tub or liquid margarine  Unsaturated fat oils (canola, olive, corn, sunflower, safflower, peanut)   Condiments Fresh or dried herbs; low-sodium ketchup; vinegar; lemon or lime juice; pepper; salt-free seasoning mixes and marinades (Mrs. Kodi Presley or Connor's salt-free blend); simple salad dressings (vinegar and oil); salt-free sauces     Foods Not Recommended  Food Group Foods Not Recommended   Grains Breads or crackers topped with salt  Cereals (hot/cold) with more than 300 milligrams sodium per serving  Biscuits, cornbread, and other quick breads prepared with baking soda  Prepackaged bread crumbs  Self-rising flours   Vegetables Canned vegetables (unless they are salt free or low sodium)  Frozen vegetables with seasoning and sauces  Sauerkraut and pickled vegetables  Canned or dried soups (unless they are salt free or low  sodium)  [place] fries and onion rings   Fruits Dried fruits preserved with sodium-containing additives   Dairy (Milk and Milk Products) Buttermilk  Processed cheeses such as Cheese Whiz, Velveeta, and [employer] (unless a low-sodium variety)  Feta cheese; shredded cheese (has more sodium than block cheese); singles slices and string cheese   Protein Foods (Meat, Poultry, Fish, Beans) Cured meats: pascual, ham, sausage, pepperoni, and hot dogs  Canned meats: chili, Sunbright sausage, sardines, and Spam  Smoked fish and meats  Frozen meals that have more than 600 milligrams sodium   Fats Salted butter or margarine   Condiments Salt, sea salt, kosher salt, onion salt, and garlic salt  Seasoning mixes containing salt (Lemon Pepper or Bouillon cubes)  Catsup or ketchup, BBQ sauce, Worcestershire and soy sauce  Salsa, pickles, olives, relish  Salad dressings: ranch, blue cheese, [place], and Western E[place]    Alcohol Check with your doctor.        Heart Failure Sample 1-Day Menu   Breakfast 1 cup regular oatmeal made with water or milk  1 cup reduced-fat (2%) milk  1 medium banana  1 slice whole wheat bread   1 tablespoon salt-free peanut butter   Morning Snack 1/2 cup dried cranberries   Lunch 3 ounces grilled chicken breast  1 cup salad greens   Olive oil and vinegar dressing (for greens)  5 unsalted or low-sodium crackers  Fruit plate with 1/4 cup strawberries  1/2 sliced orange (for fruit plate)  1 peach half (for fruit plate)   Afternoon Snack 1 ounce low-sodium turkey   1 piece whole wheat bread   Evening Meal 3 ounces herb-baked fish  1 baked potato  2 teaspoons soft margarine (trans fat-free) (for potato)  Sliced tomatoes  1/2 cup steamed spinach drizzled with lemon juice  3-inch square of adriana food cake  Fresh strawberries (2) (for cake)   Evening Snack 2 tablespoons salt-free peanut butter  5 low-sodium crackers       Call the [facility] [place] [address]  dietitians at [phone]   with any nutrition questions

## 2022-02-12 NOTE — PROGRESS NOTES
Hospitalist Progress Note      PCP: LEVAR Carvajal    Date of Admission: 2/10/2022    Chief Complaint: increased weakness    Hospital Course: reviewed. In brief this is an 80 yoF who was brought in by her significant other overnight for 1 week of increased weakness and fatigue with likely tactile fevers at home who was found to be covid-19 positive. She remains on RA but her mental status is deteriorated. Subjective: ros negative but limited based on exam    Medications:  Reviewed    Infusion Medications    sodium chloride       Scheduled Medications    amLODIPine  5 mg Oral Daily    polyethylene glycol  17 g Oral BID    melatonin  5 mg Oral Nightly    donepezil  10 mg Oral Nightly    furosemide  20 mg Oral Daily    sertraline  50 mg Oral Daily    traZODone  150 mg Oral Nightly    sodium chloride flush  5-40 mL IntraVENous 2 times per day    enoxaparin  30 mg SubCUTAneous BID     PRN Meds: labetalol, sodium chloride flush, sodium chloride, ondansetron **OR** ondansetron, polyethylene glycol, acetaminophen **OR** acetaminophen, guaiFENesin-dextromethorphan      Intake/Output Summary (Last 24 hours) at 2/12/2022 1813  Last data filed at 2/12/2022 0452  Gross per 24 hour   Intake --   Output 400 ml   Net -400 ml       Physical Exam Performed:    BP (!) 178/54   Pulse 53   Temp 98.9 °F (37.2 °C) (Axillary)   Resp 16   Ht 5' 1\" (1.549 m)   Wt 187 lb 6.3 oz (85 kg)   SpO2 92%   BMI 35.41 kg/m²   Grossly unchanged  General appearance: No apparent distress, appears stated age and cooperative. HEENT: Pupils equal, round, and reactive to light. Conjunctivae/corneas clear. Neck: Supple, with full range of motion. Respiratory:  Normal respiratory effort. Clear to auscultation, anteriorly on RA  Cardiovascular: Regular rate and rhythm with normal S1/S2 without murmurs, rubs or gallops.   Abdomen: Soft, non-tender,slightly distended nrl BS  Musculoskeletal: No clubbing, cyanosis or edema bilaterally. Full range of motion without deformity. LLE with erythema no warmth, chronic venous changes noted. LLE > RLE  Neurologic:  Neurovascularly intact without any focal sensory/motor deficits. Cranial nerves: II-XII intact, grossly non-focal.  Psychiatric: lacks insight but recognizes   Capillary Refill: Brisk,3 seconds, normal   Peripheral Pulses: +2 palpable, equal bilaterally       Labs:   Recent Labs     02/10/22  1325 02/11/22  0516   WBC 7.5 8.9   HGB 11.3* 10.8*   HCT 34.6* 33.0*    182     Recent Labs     02/10/22  1325 02/11/22  0516    141   K 4.2 3.6    106   CO2 25 26   BUN 33* 31*   CREATININE 0.8 0.7   CALCIUM 8.8 9.0     Recent Labs     02/10/22  1325 02/11/22  0516   AST 58* 46*   ALT 62* 55*   BILITOT 0.5 0.5   ALKPHOS 126 115     Recent Labs     02/10/22  2223   INR 1.22*     Recent Labs     02/10/22  1325 02/10/22  2223   TROPONINI <0.01 <0.01       Urinalysis:      Lab Results   Component Value Date    NITRU Negative 02/10/2022    WBCUA None seen 02/10/2022    BACTERIA Rare 02/10/2022    RBCUA 0-2 02/10/2022    BLOODU Negative 02/10/2022    SPECGRAV >=1.030 02/10/2022    GLUCOSEU Negative 02/10/2022       Radiology:  VL Extremity Venous Left   Final Result      XR CHEST PORTABLE   Final Result   Mild cardiomegaly. Left lung base atelectasis/infiltrate/small left pleural   effusion.                  Assessment/Plan:    Active Hospital Problems    Diagnosis     Asthenia [R53.1]     COVID-19 viremia [U07.1]     Elevated random blood glucose level [R73.09]     Hypertension, uncontrolled [I10]      Asthenia mild covid-19  -sxs >7 days ago (maybe greater than 10 days ago due to history of chronic cough)  - unvaccinated  - out of window for antiviral, no indication for steroids or immunomodulating medications  - Isolation end date at least 10 days per hospital policy from sxs onset but can be 10-20 days from symptom onset depending on severity of illness and underlying co-morbidities/immune status with evidence of improvement in symptoms and with 24 hours of no fever without the use of fever reducing medications. Isolation end date tentatively determined to be 02/13 (ten days from sxs onset if does not develop sxs)  - high risk for decompensation    htn-uncontrolled  - BP readings elevated in ED  - started ccb  - continue PRN       H/o of CHF  - Grade I diastolic dysfunction on echo in 2019 and on Lasix use  - Mild cardiomegaly with crackles noted, but no overt findings to suggest exacerbation  - Continue oral Lasix  - Consult HF nurse and dietician     Dementia  - baseline ambulates with assistance, able to follow commands and converse though forgetful  - Continue Aricept, Desyrel, and Zoloft      Medical frailty  - Significant dementia with increased assistance needs    Chronic venous changes- Low likelihood of infection at this time (no focal sxs). U/s without evidence of superficial or deep vascular obstruction    DVT Prophylaxis: enoxaparin  Diet: ADULT DIET; Clear Liquid;  Low Fat/Low Chol/High Fiber/2 gm Na  ADULT ORAL NUTRITION SUPPLEMENT; Lunch; Clear Liquid Oral Supplement  Code Status: Limited    PT/OT Eval Status: ordered    Dispo - medically ready for SNF 20/75 pending precert    Mansi Levy MD

## 2022-02-12 NOTE — PLAN OF CARE
Problem: Nutrition  Goal: Optimal nutrition therapy  Outcome: Ongoing     Problem: Fatigue  Goal: Verbalize increase energy and improved vitality  Outcome: Ongoing     Problem: Loneliness or Risk for Loneliness  Goal: Demonstrate positive use of time alone when socialization is not possible  Outcome: Ongoing     Problem: Risk for Fluid Volume Deficit  Goal: Maintain normal heart rhythm  Outcome: Ongoing  Goal: Maintain absence of muscle cramping  Outcome: Ongoing  Goal: Maintain normal serum potassium, sodium, calcium, phosphorus, and pH  Outcome: Ongoing     Problem: Nutrition Deficits  Goal: Optimize nutritional status  Outcome: Ongoing     Problem: Isolation Precautions - Risk of Spread of Infection  Goal: Prevent transmission of infection  Outcome: Ongoing     Problem:  Body Temperature -  Risk of, Imbalanced  Goal: Ability to maintain a body temperature within defined limits  Outcome: Ongoing  Goal: Will regain or maintain usual level of consciousness  Outcome: Ongoing  Goal: Complications related to the disease process, condition or treatment will be avoided or minimized  Outcome: Ongoing     Problem: Breathing Pattern - Ineffective  Goal: Ability to achieve and maintain a regular respiratory rate  Outcome: Ongoing     Problem: Gas Exchange - Impaired  Goal: Absence of hypoxia  Outcome: Ongoing  Goal: Promote optimal lung function  Outcome: Ongoing     Problem: Airway Clearance - Ineffective  Goal: Achieve or maintain patent airway  Outcome: Ongoing     Problem: Falls - Risk of:  Goal: Will remain free from falls  Description: Will remain free from falls  Outcome: Ongoing  Goal: Absence of physical injury  Description: Absence of physical injury  Outcome: Ongoing

## 2022-02-13 NOTE — PLAN OF CARE
Problem: Falls - Risk of:  Goal: Will remain free from falls  Description: Will remain free from falls  Outcome: Ongoing  Goal: Absence of physical injury  Description: Absence of physical injury  Outcome: Ongoing     Problem: Airway Clearance - Ineffective  Goal: Achieve or maintain patent airway  Outcome: Ongoing     Problem: Gas Exchange - Impaired  Goal: Absence of hypoxia  Outcome: Ongoing  Goal: Promote optimal lung function  Outcome: Ongoing     Problem: Breathing Pattern - Ineffective  Goal: Ability to achieve and maintain a regular respiratory rate  Outcome: Ongoing     Problem:  Body Temperature -  Risk of, Imbalanced  Goal: Ability to maintain a body temperature within defined limits  Outcome: Ongoing  Goal: Will regain or maintain usual level of consciousness  Outcome: Ongoing  Goal: Complications related to the disease process, condition or treatment will be avoided or minimized  Outcome: Ongoing     Problem: Isolation Precautions - Risk of Spread of Infection  Goal: Prevent transmission of infection  Outcome: Ongoing     Problem: Nutrition Deficits  Goal: Optimize nutritional status  Outcome: Ongoing     Problem: Risk for Fluid Volume Deficit  Goal: Maintain normal heart rhythm  Outcome: Ongoing  Goal: Maintain absence of muscle cramping  Outcome: Ongoing  Goal: Maintain normal serum potassium, sodium, calcium, phosphorus, and pH  Outcome: Ongoing     Problem: Loneliness or Risk for Loneliness  Goal: Demonstrate positive use of time alone when socialization is not possible  Outcome: Ongoing     Problem: Fatigue  Goal: Verbalize increase energy and improved vitality  Outcome: Ongoing     Problem: Patient Education: Go to Patient Education Activity  Goal: Patient/Family Education  Outcome: Ongoing     Problem: Nutrition  Goal: Optimal nutrition therapy  Outcome: Ongoing     Problem: Skin Integrity:  Goal: Will show no infection signs and symptoms  Description: Will show no infection signs and symptoms  Outcome: Ongoing  Goal: Absence of new skin breakdown  Description: Absence of new skin breakdown  Outcome: Ongoing     Problem: Non-Violent Restraints  Goal: Removal from restraints as soon as assessed to be safe  Outcome: Ongoing  Goal: No harm/injury to patient while restraints in use  Outcome: Ongoing  Goal: Patient's dignity will be maintained  Outcome: Ongoing

## 2022-02-13 NOTE — ACP (ADVANCE CARE PLANNING)
ADVANCED CARE PLANNING - REMOTE     Reinier Dodge       :  1935              MRN:  [id number]  Admission Date: 2/10/2022  3:01 PM  Date of Discussion:        Purpose of Encounter: Advanced care planning in light of worsening respiratory status. Parties in attendance: :Sarah Villanueva MD, Family members:Jem Mahan. Decisional Capacity:No    Remote ACP visit was performed based on new provisions and guidance offered by [facility] on 2020 in setting of COVID 19 outbreak and in order to preserve personal protective equipment in accordance with the flexibilities announced by CMS on 2020. References:   https://med. [place].[facility]/Portals/0/Resources/COVID-19/3_18%20Telemed%20Guidance%20Updated%20March%2018. pdf?vye=[email or web address]   http://[employer]/. pdf     Objective/Medical Story: In brief this is an 80 yoF who was brought in by her significant other overnight for 1 week of increased weakness and fatigue with likely tactile fevers at home who was found to be covid-19 positive she is now hypoxic and persistently removing her NC. Pts prognosis is poor and I worry that the standard of care for covid-19 is making her severely uncomfortable and not improving her quality of life. This was stressed to her POA as pt cannot make medical decisions. POA expressed understanding and again supported the idea of making sure she was comfortable but would like to continue trying oxygen supplementation and steroids evening if restraints were needed. I stressed that this may not change her prognosis/outcome and it may make her more uncomfortable throughout the process. I introduced hospice and he is willing to learn more about it and is hopeful he can have an in person visit and meet with hospice.  I expressed she was quite ill and tenuous and would speak to nursing leadership to see if we could allow an in person visit with appropriate PPE as our pt was critically ill. Active Diagnoses: Active Hospital Problems    Diagnosis Date Noted    Asthenia [R53.1] 02/10/2022    COVID-19 viremia [U07.1] 02/10/2022    Elevated random blood glucose level [R73.09] 02/10/2022    Hypertension, uncontrolled [I10] 02/10/2022       These active diagnoses are of sufficient risk that focused discussion on advance care planning is indicated in order to allow the patient to thoughtfully consider personal goals of care; and if situations arise that prevent the ability to personally give input, to ensure appropriate representation of their personal desires for different levels and agressiveness of care. Goals of Care Determinations: Patient wishes to focus on continued mgmt. He wants to meet with hospice today and come in to see pt. If hospital policy allows it, I would support it as the pt is critically ill and contemplating hospice care. POA must wear appropriate PPE to enter the room  Code Status: At this time patient wishes to be Limited    Above d/w nursing    Time Spent:     Total time spent face-to-face in education and discussion: >16 minutes. Electronically signed by Razia Grey MD on 2/13/2022 at 8:39 AM    Thank you LEVAR Varma for the opportunity to be involved in this patient's care. If you have any questions or concerns please feel free to contact me at [phone].

## 2022-02-13 NOTE — CARE COORDINATION
CM updated by Nikolay Prescott with HOC/RN that plan is for pt to be restraint free 24 hrs, removed pt from restraints today at 1115. Plan is for pt to go to the [facility] once 24 hrs restraint free. HOC will arrange transportationtommorrow Candance Frizzle will call sig other at 1000 am tomorrow to sigh consent. Nikolay Prescott spoke to significant and son today and all in agreement.  CM following-Cleopatra Fairchild, RN

## 2022-02-13 NOTE — PROGRESS NOTES
Hospitalist Progress Note      PCP: LEVAR Jeffries    Date of Admission: 2/10/2022    Chief Complaint: increased weakness    Hospital Course: reviewed. In brief this is an 80 yoF who was brought in by her significant other overnight for 1 week of increased weakness and fatigue with likely tactile fevers at home who was found to be covid-19 positive she is now hypoxic and persistently removing her NC. Please see acp note. Subjective: ros negative. Pt feels well but lacks insight into situation. Medications:  Reviewed    Infusion Medications    lactated ringers      sodium chloride       Scheduled Medications    dexamethasone  6 mg Oral Daily    potassium bicarb-citric acid  40 mEq Oral TID    amLODIPine  5 mg Oral Daily    polyethylene glycol  17 g Oral BID    melatonin  5 mg Oral Nightly    donepezil  10 mg Oral Nightly    [Held by provider] furosemide  20 mg Oral Daily    sertraline  50 mg Oral Daily    traZODone  150 mg Oral Nightly    sodium chloride flush  5-40 mL IntraVENous 2 times per day    enoxaparin  30 mg SubCUTAneous BID     PRN Meds: labetalol, sodium chloride flush, sodium chloride, ondansetron **OR** ondansetron, polyethylene glycol, acetaminophen **OR** acetaminophen, guaiFENesin-dextromethorphan    No intake or output data in the 24 hours ending 02/13/22 0839    Physical Exam Performed:    BP (!) 144/87   Pulse 63   Temp 99.5 °F (37.5 °C) (Axillary)   Resp 24   Ht 5' 1\" (1.549 m)   Wt 182 lb 1.6 oz (82.6 kg)   SpO2 (!) 83%   BMI 34.41 kg/m²     General appearance: pleasantly confused, profoundly uncomfortable when attempting to place NC.   HEENT: Pupils equal, round, and reactive to light. Conjunctivae/corneas clear. Neck: Supple, with full range of motion. Respiratory:  Normal respiratory effort. Persistent cough. sats 85% on RA. Removes NC or become very agitated wearing it.   Cardiovascular: Regular rate and rhythm with normal S1/S2 without murmurs, rubs or gallops. Abdomen: Soft, non-tender,slightly distended nrl BS  Musculoskeletal: No clubbing, cyanosis or edema bilaterally. Full range of motion without deformity. LLE with erythema no warmth, chronic venous changes noted. LLE > RLE  Neurologic:  Neurovascularly intact without any focal sensory/motor deficits. Cranial nerves: II-XII intact, grossly non-focal.  Psychiatric: lacks insight not oriented at all except to self  Capillary Refill: Brisk,3 seconds, normal   Peripheral Pulses: +2 palpable, equal bilaterally       Labs:   Recent Labs     02/10/22  1325 02/11/22  0516 02/13/22  0610   WBC 7.5 8.9 7.5   HGB 11.3* 10.8* 11.1*   HCT 34.6* 33.0* 33.8*    182 225     Recent Labs     02/10/22  1325 02/11/22  0516 02/13/22  0610    141 146*   K 4.2 3.6 2.8*    106 110   CO2 25 26 23   BUN 33* 31* 25*   CREATININE 0.8 0.7 0.6   CALCIUM 8.8 9.0 9.0     Recent Labs     02/10/22  1325 02/11/22  0516 02/13/22  0610   AST 58* 46* 39*   ALT 62* 55* 43*   BILITOT 0.5 0.5 0.8   ALKPHOS 126 115 113     Recent Labs     02/10/22  2223   INR 1.22*     Recent Labs     02/10/22  1325 02/10/22  2223   TROPONINI <0.01 <0.01       Urinalysis:      Lab Results   Component Value Date    NITRU Negative 02/10/2022    WBCUA None seen 02/10/2022    BACTERIA Rare 02/10/2022    RBCUA 0-2 02/10/2022    BLOODU Negative 02/10/2022    SPECGRAV >=1.030 02/10/2022    GLUCOSEU Negative 02/10/2022       Radiology:  VL Extremity Venous Left   Final Result      XR CHEST PORTABLE   Final Result   Mild cardiomegaly. Left lung base atelectasis/infiltrate/small left pleural   effusion.                  Assessment/Plan:    Active Hospital Problems    Diagnosis     Asthenia [R53.1]     COVID-19 viremia [U07.1]     Elevated random blood glucose level [R73.09]     Hypertension, uncontrolled [I10]      Acute hypoxic respiratory failure- d/t COVID-19    Severe COVID-19  -sxs >7 days ago (maybe greater than 10 days ago due to history of chronic cough)  - unvaccinated  - out of window for antiviral  - steroids x 10 days (02/13-02/22)  - monitor for need to start rufina or IL6 inhibitor  - Isolation end date at least 10 days per hospital policy from sxs onset but can be 10-20 days from symptom onset depending on severity of illness and underlying co-morbidities/immune status with evidence of improvement in symptoms and with 24 hours of no fever without the use of fever reducing medications. Isolation end date not yet determined due to worsening clinical status  -See ACP note, Hospice c/s    Hypernatremic hypokalemic- d/t poor PO intake. Started mIVF but stressed importance of [address] with POA. Electrolyte repletion added    htn-improved  - BP readings elevated in ED  - continue ccb  - continue PRN       H/o of CHF  - Grade I diastolic dysfunction on echo in 2019 and on Lasix use  - Mild cardiomegaly with crackles noted, but no overt findings to suggest exacerbation  - hold Lasix  - Consult HF nurse and dietician     Dementia  - baseline ambulates with assistance, able to follow commands and converse though forgetful  - Continue Aricept, Desyrel, and Zoloft      Medical frailty  - Significant dementia with increased assistance needs    Chronic venous changes- Low likelihood of infection at this time (no focal sxs). U/s without evidence of superficial or deep vascular obstruction    Plan d/w nursing, family updated 02/13    DVT Prophylaxis: enoxaparin  Diet: ADULT DIET; Clear Liquid;  Low Fat/Low Chol/High Fiber/2 gm Na  ADULT ORAL NUTRITION SUPPLEMENT; Lunch; Clear Liquid Oral Supplement  Code Status: Limited    PT/OT Eval Status: ordered    Dispo - not medically ready, prognosis poor, pending hospice eval    Lawanda Saenz MD

## 2022-02-13 NOTE — PROGRESS NOTES
Restraints discontinued at 1115. Patient to transfer to inpatient hospice tomorrow per [address] nurse. [address] nurse and family at bedside.

## 2022-02-14 NOTE — DISCHARGE INSTR - COC
Continuity of Care Form    Patient Name: Daysi Mullins   :  1935  MRN:  [id number]    Admit date:  2/10/2022  Discharge date:  2022    Code Status Order: Limited   Advance Directives:      Admitting Physician:  Mayra Morgan DO  PCP: LEVAR Felix    Discharging Nurse: [facility] Unit/Room#: 4224/5633-16  Discharging Unit Phone Number: [phone]    Emergency Contact:   Extended Emergency Contact Information  Primary Emergency Contact: HuyJem mars  Address: Jerardo Leong [address] Phone: [phone]  Mobile Phone: [phone]  Relation: Other  Secondary Emergency Contact: [address] [place] [address], [facility] Phone: [phone]  Relation: Other    Past Surgical History:  History reviewed. No pertinent surgical history.     Immunization History:   Immunization History   Administered Date(s) Administered    Pneumococcal Polysaccharide ([id number]) 2011       Active Problems:  Patient Active Problem List   Diagnosis Code    Skin cancer C44.90    Vitamin B12 deficiency E53.8    Aortic heart murmur I35.8    Post herpetic neuralgia B02.29    Memory loss R41.3    Peripheral edema R60.9    Dementia with behavioral disturbance (HCC) F03.91    Asthenia R53.1    COVID-19 U07.1    Elevated random blood glucose level R73.09    Hypertension, uncontrolled I10       Isolation/Infection:   Isolation            Droplet Plus          Patient Infection Status       Infection Onset Added Last Indicated Last Indicated By Review Planned Expiration Resolved Resolved By    COVID-19 02/10/22 02/10/22 02/10/22 SARS-CoV-2 NAAT (Rapid) 22      Resolved    COVID-19 (Rule Out) 02/10/22 02/10/22 02/10/22 SARS-CoV-2 NAAT (Rapid) (Ordered)   02/10/22 Rule-Out Test Resulted            Nurse Assessment:  Last Vital Signs: BP (!) 117/90   Pulse 131   Temp 97.4 °F (36.3 °C) (Axillary)   Resp 24   Ht 5' 1\" (1.549 m)   Wt 180 lb 4.8 oz (81.8 kg)   SpO2 (!) 85%   BMI 34.07 kg/m² Last documented pain score (0-10 scale): Pain Level: 0  Last Weight:   Wt Readings from Last 1 Encounters:   02/14/22 180 lb 4.8 oz (81.8 kg)     Mental Status:  disoriented    IV Access:  - Peripheral IV - site  left wrist, insertion date: 2/10/2022    Nursing Mobility/ADLs:  Walking   Dependent  Transfer  Dependent  Bathing  Dependent  Dressing  Dependent  Toileting  Dependent  Feeding  Dependent  Med Admin  Dependent  Med Delivery   crushed    Wound Care Documentation and Therapy:        Elimination:  Continence: Bowel: No  Bladder: No  Urinary Catheter: None   Colostomy/Ileostomy/Ileal Conduit: No       Date of Last BM: 2/14/2022    Intake/Output Summary (Last 24 hours) at 2/14/2022 1208  Last data filed at 2/14/2022 0530  Gross per 24 hour   Intake 180 ml   Output 900 ml   Net -720 ml     I/O last 3 completed shifts: In: 180 [P.O.:180]  Out: 900 [Urine:900]    Safety Concerns: At Risk for Falls    Impairments/Disabilities:      None    Nutrition Therapy:  Current Nutrition Therapy:   - Oral Diet:  Clear Liquid    Routes of Feeding: None  Liquids: Thin Liquids  Daily Fluid Restriction: no  Last Modified Barium Swallow with Video (Video Swallowing Test): not done    Treatments at the Time of Hospital Discharge:   Respiratory Treatments: ***  Oxygen Therapy:  is not on home oxygen therapy.   Ventilator:    - No ventilator support    Rehab Therapies: {THERAPEUTIC INTERVENTION:1158748516}  Weight Bearing Status/Restrictions: No weight bearing restirctions  Other Medical Equipment (for information only, NOT a DME order):  {EQUIPMENT:334303930}  Other Treatments: ***    Patient's personal belongings (please select all that are sent with patient):  None    RN SIGNATURE:  Electronically signed by Daniel Partida RN on 2/14/22 at 2:23 PM EST    CASE MANAGEMENT/SOCIAL WORK SECTION    Inpatient Status Date:  2/10/22     Readmission Risk Assessment Score:  Readmission Risk              Risk of Unplanned Readmission: 16           Discharging to Facility/ Agency   Name: [facility]   Address:  Phone:  Fax:    Dialysis Facility (if applicable) N/A  Name:  Address:  Dialysis Schedule:  Phone:  Fax:    / signature: Electronically signed by Otto Rodríguez RN on 2/14/22 at 12:09 PM EST    PHYSICIAN SECTION    Prognosis: Poor    Condition at Discharge: Terminal    Rehab Potential (if transferring to Rehab): Poor    Recommended Labs or Other Treatments After Discharge: None    Physician Certification: I certify the above information and transfer of Perla Sanabria  is necessary for the continuing treatment of the diagnosis listed and that she requires Hospice for less 30 days.      Update Admission H&P: No change in H&P    PHYSICIAN SIGNATURE:  Electronically signed by German Orellana MD on 2/14/22 at 1:11 PM EST

## 2022-02-14 NOTE — CARE COORDINATION
2/14/22 D/C order noted. [address] liaison will facilitate discharge. Please notify CM should any other needs arise.

## 2022-02-14 NOTE — PROGRESS NOTES
Patient is set up to go to [facility] today at 2pm.  Discussed with nursing and CM Alex. Jem CORRALES aware. I will call report. Thank you for this opportunity to SERVE this patient and family. Lindsay Hills RN, [address], [address] [place] [address]   O: [phone]  C: [phone]  F: [phone]   Main & Referrals: [phone]   [email or web address]

## 2022-02-14 NOTE — PROGRESS NOTES
Family okay to visit in the room and gown up per Dr. Jessica Rivera. Charge Nurse aware and in agreement. Hospice consulted.

## 2022-02-14 NOTE — PLAN OF CARE
Problem: Falls - Risk of:  Goal: Will remain free from falls  Description: Will remain free from falls  Outcome: Ongoing  Goal: Absence of physical injury  Description: Absence of physical injury  Outcome: Ongoing     Problem: Airway Clearance - Ineffective  Goal: Achieve or maintain patent airway  Outcome: Ongoing     Problem: Gas Exchange - Impaired  Goal: Absence of hypoxia  Outcome: Ongoing  Goal: Promote optimal lung function  Outcome: Ongoing     Problem: Breathing Pattern - Ineffective  Goal: Ability to achieve and maintain a regular respiratory rate  Outcome: Ongoing     Problem:  Body Temperature -  Risk of, Imbalanced  Goal: Ability to maintain a body temperature within defined limits  Outcome: Ongoing  Goal: Will regain or maintain usual level of consciousness  Outcome: Ongoing  Goal: Complications related to the disease process, condition or treatment will be avoided or minimized  Outcome: Ongoing     Problem: Isolation Precautions - Risk of Spread of Infection  Goal: Prevent transmission of infection  Outcome: Ongoing     Problem: Nutrition Deficits  Goal: Optimize nutritional status  Outcome: Ongoing     Problem: Risk for Fluid Volume Deficit  Goal: Maintain normal heart rhythm  Outcome: Ongoing     Problem: Risk for Fluid Volume Deficit  Goal: Maintain absence of muscle cramping  Outcome: Ongoing     Problem: Risk for Fluid Volume Deficit  Goal: Maintain normal serum potassium, sodium, calcium, phosphorus, and pH  Outcome: Ongoing     Problem: Loneliness or Risk for Loneliness  Goal: Demonstrate positive use of time alone when socialization is not possible  Outcome: Ongoing     Problem: Fatigue  Goal: Verbalize increase energy and improved vitality  Outcome: Ongoing     Problem: Nutrition  Goal: Optimal nutrition therapy  Outcome: Ongoing     Problem: Skin Integrity:  Goal: Will show no infection signs and symptoms  Description: Will show no infection signs and symptoms  Outcome: Ongoing  Goal: Absence of new skin breakdown  Description: Absence of new skin breakdown  Outcome: Ongoing

## 2022-02-14 NOTE — PROGRESS NOTES
Hospitalist Progress Note      PCP: LEVAR Moncada    Date of Admission: 2/10/2022    Chief Complaint: increased weakness    Subjective: no new c/o. Medications:  Reviewed    Infusion Medications    sodium chloride       Scheduled Medications    dexamethasone  6 mg Oral Daily    amLODIPine  5 mg Oral Daily    polyethylene glycol  17 g Oral BID    melatonin  5 mg Oral Nightly    donepezil  10 mg Oral Nightly    [Held by provider] furosemide  20 mg Oral Daily    sertraline  50 mg Oral Daily    traZODone  150 mg Oral Nightly    sodium chloride flush  5-40 mL IntraVENous 2 times per day    enoxaparin  30 mg SubCUTAneous BID     PRN Meds: labetalol, sodium chloride flush, sodium chloride, ondansetron **OR** ondansetron, polyethylene glycol, acetaminophen **OR** acetaminophen, guaiFENesin-dextromethorphan      Intake/Output Summary (Last 24 hours) at 2/14/2022 0918  Last data filed at 2/14/2022 0530  Gross per 24 hour   Intake 180 ml   Output 900 ml   Net -720 ml       Physical Exam Performed:    BP (!) 117/90   Pulse 131   Temp 97.4 °F (36.3 °C) (Axillary)   Resp 24   Ht 5' 1\" (1.549 m)   Wt 180 lb 4.8 oz (81.8 kg)   SpO2 (!) 85%   BMI 34.07 kg/m²     General appearance: No apparent distress, appears stated age and cooperative. HEENT: Pupils equal, round, and reactive to light. Conjunctivae/corneas clear. Neck: Supple, with full range of motion. No jugular venous distention. Trachea midline. Respiratory:  Normal respiratory effort. Clear to auscultation, bilaterally without Rales/Wheezes/Rhonchi. Cardiovascular: Regular rate and rhythm with normal S1/S2 without murmurs, rubs or gallops. Abdomen: Soft, non-tender, non-distended with normal bowel sounds. Musculoskeletal: No clubbing, cyanosis or edema bilaterally. Full range of motion without deformity. Skin: Skin color, texture, turgor normal.  No rashes or lesions.   Neurologic:  Neurovascularly intact without any focal sensory/motor deficits. Cranial nerves: II-XII intact, grossly non-focal.  Psychiatric: Alert and oriented, thought content appropriate, normal insight  Capillary Refill: Brisk,< 3 seconds   Peripheral Pulses: +2 palpable, equal bilaterally       Labs:   Recent Labs     02/13/22  0610   WBC 7.5   HGB 11.1*   HCT 33.8*        Recent Labs     02/13/22  0610 02/13/22 1943   *  --    K 2.8* 3.7     --    CO2 23  --    BUN 25*  --    CREATININE 0.6  --    CALCIUM 9.0  --      Recent Labs     02/13/22  0610   AST 39*   ALT 43*   BILITOT 0.8   ALKPHOS 113     No results for input(s): INR in the last 72 hours. No results for input(s): [employer] in the last 72 hours. Urinalysis:      Lab Results   Component Value Date    NITRU Negative 02/10/2022    WBCUA None seen 02/10/2022    BACTERIA Rare 02/10/2022    RBCUA 0-2 02/10/2022    BLOODU Negative 02/10/2022    SPECGRAV >=1.030 02/10/2022    GLUCOSEU Negative 02/10/2022       Consults:    IP CONSULT TO HEART FAILURE NURSE/COORDINATOR  IP CONSULT TO DIETITIAN  IP CONSULT TO SOCIAL WORK  IP CONSULT TO HOSPICE      Assessment/Plan:    Active Hospital Problems    Diagnosis     Asthenia [R53.1]     COVID-19 [U07.1]     Elevated random blood glucose level [R73.09]     Hypertension, uncontrolled [I10]          Acute hypoxic respiratory failure- d/t COVID-19     Severe COVID-19  -sxs >7 days ago (maybe greater than 10 days ago due to history of chronic cough)  - unvaccinated  - out of window for antiviral  - steroids x 10 days (02/13-02/22)  - monitor for need to start rufina or IL6 inhibitor  - Isolation end date at least 10 days per hospital policy from sxs onset but can be 10-20 days from symptom onset depending on severity of illness and underlying co-morbidities/immune status with evidence of improvement in symptoms and with 24 hours of no fever without the use of fever reducing medications.  Isolation end date not yet determined due to worsening clinical status  -See ACP note, Hospice c/s     Hypernatremic hypokalemic- d/t poor PO intake. Started mIVF but stressed importance of [address] with POA. Electrolyte repletion added     htn-improved  - BP readings elevated in ED  - continue ccb  - continue PRN       H/o of CHF  - Grade I diastolic dysfunction on echo in 2019 and on Lasix use  - Mild cardiomegaly with crackles noted, but no overt findings to suggest exacerbation  - hold Lasix  - Consult HF nurse and dietician     Dementia  - baseline ambulates with assistance, able to follow commands and converse though forgetful  - Continue Aricept, Desyrel, and Zoloft      Medical frailty  - Significant dementia with increased assistance needs     Chronic venous changes- Low likelihood of infection at this time (no focal sxs). U/s without evidence of superficial or deep vascular obstruction         DVT Prophylaxis: BID LMWH    Recent Labs     02/13/22  0610        Diet: ADULT ORAL NUTRITION SUPPLEMENT; Lunch; Clear Liquid Oral Supplement  ADULT DIET; Clear Liquid  Code Status: Limited - hospice consulted and appreciated. PT/OT Eval Status: N/A    Dispo - likely to [address] Monday 14 Feb. Significant other/POA Jem present at bedside 14 Feb when seen.      Shaina Small MD

## 2022-02-14 NOTE — PROGRESS NOTES
Occupational Therapy/Physical Therapy  Pt transferring to hospice IPU. Will sign off OT/PT at this time.  Thank you,  POWER Ospina/JUANCHO

## 2022-02-14 NOTE — PROGRESS NOTES
Report called given to MANAV Santana at [facility]. All questions and concerns were addressed. Transport arrived to escort the patient from the unit.

## 2022-02-14 NOTE — CARE COORDINATION
2/14/22 Pt will d/c today to [facility]. Transport set up for 2 pm, pending d/c order.

## 2022-02-14 NOTE — DISCHARGE SUMMARY
Hospital Medicine Discharge Summary    Patient ID: Hieu Montemayor      Patient's PCP: LEVAR Bell    Admit Date: 2/10/2022     Discharge Date: 2/14/2022      Admitting Physician: Rhoda Sharma DO     Discharge Physician: Bee Rogers MD     Discharge Diagnoses: Active Hospital Problems    Diagnosis     Asthenia [R53.1]     COVID-19 [U07.1]     Elevated random blood glucose level [R73.09]     Hypertension, uncontrolled [I10]        The patient was seen and examined on day of discharge and this discharge summary is in conjunction with any daily progress note from day of discharge. Hospital Course:            Acute hypoxic respiratory failure- d/t COVID-19     Severe COVID-19  -sxs >7 days ago (maybe greater than 10 days ago due to history of chronic cough)  - unvaccinated  - out of window for antiviral  - steroids x 10 days (02/13-02/22)  -See ACP note, Hospice c/s     Hypernatremic hypokalemic- d/t poor PO intake. Started mIVF but stressed importance of [address] with POA. Electrolyte repletion added     htn-improved  - BP readings elevated in ED  - continue ccb  - continue PRN       H/o of CHF  - Grade I diastolic dysfunction on echo in 2019 and on Lasix use  - Mild cardiomegaly with crackles noted, but no overt findings to suggest exacerbation  - hold Lasix  - Consult HF nurse and dietician     Dementia  - baseline ambulates with assistance, able to follow commands and converse though forgetful  - Continue Aricept, Desyrel, and Zoloft      Medical frailty  - Significant dementia with increased assistance needs     Chronic venous changes- Low likelihood of infection at this time (no focal sxs). U/s without evidence of superficial or deep vascular obstruction        Labs:  For convenience and continuity at follow-up the following most recent labs are provided:      CBC:    Lab Results   Component Value Date    WBC 7.5 02/13/2022    HGB 11.1 02/13/2022    HCT 33.8 02/13/2022     02/13/2022       Renal:    Lab Results   Component Value Date     02/13/2022    K 3.7 02/13/2022    K 2.8 02/13/2022     02/13/2022    CO2 23 02/13/2022    BUN 25 02/13/2022    CREATININE 0.6 02/13/2022    CALCIUM 9.0 02/13/2022         Significant Diagnostic Studies    Radiology:   XR CHEST PORTABLE   Final Result   Persisting left basilar consolidation, pneumonia versus atelectasis. Component of left pleural effusion is also possible. VL Extremity Venous Left   Final Result      XR CHEST PORTABLE   Final Result   Mild cardiomegaly. Left lung base atelectasis/infiltrate/small left pleural   effusion. Consults:     IP CONSULT TO HEART FAILURE NURSE/COORDINATOR  IP CONSULT TO DIETITIAN  IP CONSULT TO SOCIAL WORK  IP CONSULT TO HOSPICE    Disposition: Hospice IPU     Condition at Discharge: Terminal    Discharge Instructions/Follow-up:  w/ PCP 1-2 weeks and subspecialists as arranged. Code Status:  DNR    Activity: activity as tolerated    Diet: regular diet      Discharge Medications:     Discharge Medication List as of 2/14/2022  2:24 PM           Details   dexamethasone (DECADRON) 6 MG tablet Take 1 tablet by mouth daily for 7 days, Disp-7 tablet, R-0NO PRINT      amLODIPine (NORVASC) 5 MG tablet Take 1 tablet by mouth daily, Disp-30 tablet, R-0NO PRINT              Details   sertraline (ZOLOFT) 50 MG tablet Take 1 tablet by mouth daily, Disp-90 tablet, R-1Normal      traZODone (DESYREL) 100 MG tablet Take 1.5 tablets by mouth nightly, Disp-135 tablet, R-1Normal      donepezil (ARICEPT) 10 MG tablet Take 1 tablet by mouth nightly, Disp-90 tablet, R-1Normal             Time Spent on discharge is more than 30 minutes in the examination, evaluation, counseling and review of medications and discharge plan. Signed:    German Goodwin MD   2/14/2022      Thank you LEVAR Prabhakar for the opportunity to be involved in this patient's care.  If you have any questions or concerns please feel free to contact me at [phone].

## 2022-02-15 NOTE — TELEPHONE ENCOUNTER
"Subjective   Ayah Covarrubias is a 55 y.o. female.     History of Present Illness   Pt presents, referred by PCP, for a possible vaginal polyp. Pt states her  noticed it during intercourse 1.5 weeks ago. She denies any pain. Does note a \"pressure feeling like a tampon\" and some mild pelvic cramping. She can palpate a knot on the left vaginal wall. She noticed a \"leaking\" with some odor, even immediately after showering in the last 2-3 weeks.    Pt S/P Hysterectomy W/BSO in 2015 for menorrhagia and discovery of a borderline ovarian cancer. Pt has had no complications until now. PCP performed pap smear at her visit which was WNL with moderate inflammation. HPV results are pending.     The following portions of the patient's history were reviewed and updated as appropriate: allergies, current medications, past family history, past medical history, past social history, past surgical history and problem list.    Review of Systems   Constitutional: Negative.  Negative for chills and fever.   Respiratory: Negative.    Cardiovascular: Negative.    Genitourinary: Positive for pelvic pain (cramping), vaginal discharge and vaginal pain (pressure and discomfort without true pain). Negative for urinary incontinence, difficulty urinating, dyspareunia, dysuria, frequency, genital sores, urgency and vaginal bleeding.       Objective    Vitals:    01/14/19 1316   BP: 122/82   Pulse: 81   Resp: 16   SpO2: 99%         01/14/19  1316   Weight: 78 kg (172 lb)     Body mass index is 30.47 kg/m².    Physical Exam   Constitutional: She is oriented to person, place, and time. She appears well-developed and well-nourished.   Genitourinary: There is no rash, tenderness, lesion, injury or Bartholin's cyst on the right labia. There is no rash, tenderness, lesion, injury or Bartholin's cyst on the left labia. Uterus is absent.   Cervix is absent. Right adnexum is absent.Left adnexum is absent.Vagina exhibits lesion (left lateral " Called Jem gibbs. vaginal wall there is a 3mm protrusion wide base, smooth texture, color consistent with the surrounding vaginal tissue.  ). Rugosity: mild. There is erythema in the vagina. No tenderness or bleeding in the vagina. No foreign body in the vagina. No signs of injury around the vagina. Vaginal discharge (moderate amount of thin, pale yellow discharge. Mild erythema but very mild. ) found. No cystocele or rectocele present.  Genitourinary Comments: Wet prep collected: positive for WBC TNTC, few clue cells but otherwise normal epithelial cells, no yeast buds, hyphae or trichomonads. Evaluated by DEREK Sullivan.    Neurological: She is alert and oriented to person, place, and time.   Vitals reviewed.      Assessment/Plan   Diagnoses and all orders for this visit:    Acute vaginitis  -     clindamycin (CLEOCIN) 2 % vaginal cream; Insert 1 applicator into the vagina Every Night for 7 days.    Vaginal polyp    Other orders  -     fluconazole (DIFLUCAN) 150 MG tablet; Take 1 tablet by mouth 1 (One) Time for 1 dose. Repeat dose in 72 hours if still symptomatic      I discussed findings of inflammation within the vagina. This may be inflaming the area of concern. I recommend that we first treat with clindamycin vaginal cream to reduce the inflammation. Take Diflucan only PRN for secondary candida infection. No intercourse while on clindamycin. I discussed and gave information on vulvar hygiene guidelines. Pt takes only baths and uses caress soap at times. She agrees to adjust these behaviors to prevent pH imbalances. After completion of clindamycin monitor raised area. If enlarging, becoming painful, or any other concerns, RTC for further evaluation. Pt agrees with plan of care. All questions were answered.

## 2022-02-15 NOTE — TELEPHONE ENCOUNTER
Jem calling back, thought of something else that he wanted to ask, not able to give me full message but asks for a call back at your convenience at mobile number [phone].

## 2022-02-15 NOTE — TELEPHONE ENCOUNTER
[facility] Initial Follow Up Call    Outreach made within 2 business days of discharge: Yes    Patient: Hieu Montemayor Patient : 1935   MRN: <[id number]>  Reason for Admission: There are no discharge diagnoses documented for the most recent discharge. Discharge Date: 22       Spoke with: Lyly Khanna per hipaa. Will not schedule TCM appointment at this time. Patient DC with Hospice. Discharge department/facility: [facility]    Non-face-to-face services provided:  Obtained and reviewed discharge summary and/or continuity of care documents    TCM Interactive Patient Contact:  Was patient able to fill all prescriptions: Yes  Was patient instructed to bring all medications to the follow-up visit: Yes  Is patient taking all medications as directed in the discharge summary? Yes  Does patient understand their discharge instructions: Yes  Does patient have questions or concerns that need addressed prior to 7-14 day follow up office visit: no    Scheduled appointment with PCP within 7-14 days    Follow Up  No future appointments.     Lito Mcallister RN

## 2022-02-16 NOTE — PROGRESS NOTES
Physician Progress Note      Agn Perales  CSN #:                  [id number]  :                       1935  ADMIT DATE:       2/10/2022 3:01 PM  [address] [place] DATE:        2022 2:32 PM  RESPONDING  PROVIDER #:        Elizabeth Trevino MD          QUERY TEXT:    Patient admitted with COVID-19, repeat cxr found: Persisting left basilar   consolidation. If possible, please document in progress notes and discharge   summary if you are evaluating and/or treating any of the following: The medical record reflects the following:    Risk Factors: COVID-19    Clinical Indicators:  CXR: Persisting left basilar consolidation  developed acute hypoxic respiratory failure    Treatment: repeat imaging, supplemental oxygen as tolerated, supportive care    Thank you,  Virgil Soto RN CDS  [email or web address]  Options provided:  -- COVID-19 pneumonia  -- radiographic finding represents other specified, please specify  -- radiographic finding of left basilar consolidation not clinically   significant  -- Other - I will add my own diagnosis  -- Disagree - Not applicable / Not valid  -- Disagree - Clinically unable to determine / Unknown  -- Refer to Clinical Documentation Reviewer    PROVIDER RESPONSE TEXT:    This patient has COVID-19 pneumonia. Query created by:  Earnestine Ford on 2022 1:10 PM      Electronically signed by:  Elizabeth Trevino MD 2022 1:43 PM

## 2022-04-11 ENCOUNTER — TELEPHONE (OUTPATIENT)
Dept: FAMILY MEDICINE CLINIC | Age: 87
End: 2022-04-11

## 2022-04-11 NOTE — TELEPHONE ENCOUNTER
Jones Holcomb, is this something you can help with, or do we just direct him towards the billing department?

## 2022-04-11 NOTE — TELEPHONE ENCOUNTER
Rossy Burciaga called and stated she is . She  2022. He wanted to know what to do about her bills. Someone please call him.

## 2022-08-23 NOTE — TELEPHONE ENCOUNTER
Darryl Rodarte is asking to speak with Charles Mendenhall about Matilde Gaston and AWV.
I spoke with Derick Mcdermott and he stated that Freddy Amezcua has been sick since she left the hospital. He stated that she has a cough and a lack of appetite, he also stated that she has a few occurrences of vomiting after meals.  Scheduled for an appointment with Rosangela Virgen on 2/10/2022 at 1 pm.
oral
